# Patient Record
Sex: MALE | Race: WHITE | NOT HISPANIC OR LATINO | Employment: OTHER | URBAN - METROPOLITAN AREA
[De-identification: names, ages, dates, MRNs, and addresses within clinical notes are randomized per-mention and may not be internally consistent; named-entity substitution may affect disease eponyms.]

---

## 2024-01-29 ENCOUNTER — OFFICE VISIT (OUTPATIENT)
Dept: NEUROSURGERY | Facility: HOSPITAL | Age: 74
End: 2024-01-29
Payer: MEDICARE

## 2024-01-29 VITALS
BODY MASS INDEX: 30.16 KG/M2 | HEIGHT: 74 IN | DIASTOLIC BLOOD PRESSURE: 71 MMHG | RESPIRATION RATE: 18 BRPM | HEART RATE: 69 BPM | SYSTOLIC BLOOD PRESSURE: 106 MMHG | WEIGHT: 235 LBS

## 2024-01-29 DIAGNOSIS — M48.062 SPINAL STENOSIS OF LUMBAR REGION WITH NEUROGENIC CLAUDICATION: Primary | ICD-10-CM

## 2024-01-29 PROCEDURE — 99202 OFFICE O/P NEW SF 15 MIN: CPT | Performed by: NEUROLOGICAL SURGERY

## 2024-01-29 PROCEDURE — 1036F TOBACCO NON-USER: CPT | Performed by: NEUROLOGICAL SURGERY

## 2024-01-29 PROCEDURE — 99212 OFFICE O/P EST SF 10 MIN: CPT | Performed by: NEUROLOGICAL SURGERY

## 2024-01-29 PROCEDURE — 1159F MED LIST DOCD IN RCRD: CPT | Performed by: NEUROLOGICAL SURGERY

## 2024-01-29 PROCEDURE — 1125F AMNT PAIN NOTED PAIN PRSNT: CPT | Performed by: NEUROLOGICAL SURGERY

## 2024-01-29 RX ORDER — FUROSEMIDE 20 MG/1
20 TABLET ORAL DAILY PRN
COMMUNITY

## 2024-01-29 RX ORDER — ERGOCALCIFEROL 1.25 MG/1
50000 CAPSULE ORAL
COMMUNITY
Start: 2021-02-26

## 2024-01-29 RX ORDER — ROSUVASTATIN CALCIUM 5 MG/1
5 TABLET, COATED ORAL
COMMUNITY
Start: 2021-01-21

## 2024-01-29 RX ORDER — METOPROLOL TARTRATE 50 MG/1
25 TABLET ORAL 2 TIMES DAILY
COMMUNITY
Start: 2021-07-26

## 2024-01-29 RX ORDER — FLUOXETINE HYDROCHLORIDE 40 MG/1
40 CAPSULE ORAL
COMMUNITY
Start: 2021-06-14

## 2024-01-29 RX ORDER — CLOPIDOGREL BISULFATE 75 MG/1
75 TABLET ORAL DAILY
COMMUNITY

## 2024-01-29 RX ORDER — EVOLOCUMAB 140 MG/ML
140 INJECTION, SOLUTION SUBCUTANEOUS
COMMUNITY

## 2024-01-29 RX ORDER — BUPROPION HYDROCHLORIDE 75 MG/1
75 TABLET ORAL
COMMUNITY
Start: 2024-01-08

## 2024-01-29 RX ORDER — ASPIRIN 81 MG/1
81 TABLET ORAL 2 TIMES DAILY
COMMUNITY
Start: 2021-08-26

## 2024-01-29 RX ORDER — LANOLIN ALCOHOL/MO/W.PET/CERES
1000 CREAM (GRAM) TOPICAL
COMMUNITY
Start: 2021-03-25

## 2024-01-29 RX ORDER — FAMOTIDINE 20 MG/1
20 TABLET, FILM COATED ORAL 2 TIMES DAILY
COMMUNITY

## 2024-01-29 ASSESSMENT — ENCOUNTER SYMPTOMS
ALLERGIC/IMMUNOLOGIC NEGATIVE: 1
APNEA: 1
FATIGUE: 1
CONSTIPATION: 1
APPETITE CHANGE: 1
BRUISES/BLEEDS EASILY: 1
CONFUSION: 1
SHORTNESS OF BREATH: 1
DIFFICULTY URINATING: 1
ENDOCRINE NEGATIVE: 1
BACK PAIN: 1
ACTIVITY CHANGE: 1
EYES NEGATIVE: 1
WEAKNESS: 1
NECK PAIN: 1

## 2024-01-29 ASSESSMENT — PAIN SCALES - GENERAL: PAINLEVEL: 8

## 2024-01-29 NOTE — PROGRESS NOTES
"Today is here for ache, stabbing and burning pain in the back and down both legs R>L.  Has not had PT or pain management.  This has been 8 months.  At it's worse the pain is a 8 on scale of 0 to 10.    73-year-old man presents for evaluation of back pain and pain running down his legs.  He also complains about some pain in his neck.  The leg pain has been present for roughly 6 months of the back pain is much older.  He has not tried physical therapy or pain management.  He sometimes feels that his legs will give way.    Review of Systems   Constitutional:  Positive for activity change, appetite change and fatigue.   HENT: Negative.     Eyes: Negative.    Respiratory:  Positive for apnea and shortness of breath.    Cardiovascular:  Positive for leg swelling.   Gastrointestinal:  Positive for constipation.   Endocrine: Negative.    Genitourinary:  Positive for difficulty urinating.   Musculoskeletal:  Positive for back pain, gait problem and neck pain.   Skin: Negative.    Allergic/Immunologic: Negative.    Neurological:  Positive for weakness.   Hematological:  Bruises/bleeds easily.   Psychiatric/Behavioral:  Positive for confusion.        Visit Vitals  /71   Pulse 69   Resp 18   Ht 1.88 m (6' 2\")   Wt 107 kg (235 lb)   BMI 30.17 kg/m²   Smoking Status Never   BSA 2.36 m²           Current Outpatient Medications:     aspirin 81 mg EC tablet, Take 1 tablet (81 mg) by mouth 2 times a day., Disp: , Rfl:     buPROPion (Wellbutrin) 75 mg tablet, Take 1 tablet (75 mg) by mouth once daily., Disp: , Rfl:     clopidogrel (Plavix) 75 mg tablet, Take 1 tablet (75 mg) by mouth once daily., Disp: , Rfl:     cyanocobalamin (Vitamin B-12) 1,000 mcg tablet, Take 1 tablet (1,000 mcg) by mouth once daily., Disp: , Rfl:     ergocalciferol (Vitamin D-2) 1.25 MG (91439 UT) capsule, Take 1 capsule (50,000 Units) by mouth 1 (one) time per week., Disp: , Rfl:     famotidine (Pepcid) 20 mg tablet, Take 1 tablet (20 mg) by mouth 2 " times a day., Disp: , Rfl:     FLUoxetine (PROzac) 40 mg capsule, Take 1 capsule (40 mg) by mouth once daily., Disp: , Rfl:     furosemide (Lasix) 20 mg tablet, Take 1 tablet (20 mg) by mouth once daily as needed., Disp: , Rfl:     metoprolol tartrate (Lopressor) 50 mg tablet, Take 1 tablet by mouth once daily., Disp: , Rfl:     Repatha SureClick 140 mg/mL injection, Inject 1 mL (140 mg) under the skin every 14 (fourteen) days., Disp: , Rfl:     rosuvastatin (Crestor) 5 mg tablet, Take 1 tablet (5 mg) by mouth once daily., Disp: , Rfl:       Objective   Neurological Exam    On physical exam, the patient is alert and interactive.  He has good range of motion at the neck and in the low back.  Strength is 5 out of 5 throughout.  There is no evidence of hypertonia or hyperreflexia.      MRIs of the cervical and lumbar spine that I personally reviewed demonstrate multilevel degenerative disease.  There multiple small disc bulges in the neck but no kam cord compression.  In his lumbar spine, there is a grade 1 spondylolisthesis at L4-5 resulting in moderate to severe canal stenosis.    The patient has axial back pain and radiculopathy but has not yet attempted any nonoperative management.  Referrals made for physical therapy and pain medicine evaluation for symptom control.  If these do not help, he may be a candidate for lumbar decompression and fusion.

## 2024-02-06 ENCOUNTER — APPOINTMENT (OUTPATIENT)
Dept: NEUROSURGERY | Facility: CLINIC | Age: 74
End: 2024-02-06
Payer: MEDICARE

## 2024-02-07 ENCOUNTER — OFFICE VISIT (OUTPATIENT)
Dept: PAIN MEDICINE | Facility: CLINIC | Age: 74
End: 2024-02-07
Payer: MEDICARE

## 2024-02-07 VITALS
HEIGHT: 74 IN | WEIGHT: 235 LBS | SYSTOLIC BLOOD PRESSURE: 112 MMHG | RESPIRATION RATE: 22 BRPM | BODY MASS INDEX: 30.16 KG/M2 | DIASTOLIC BLOOD PRESSURE: 78 MMHG | HEART RATE: 68 BPM

## 2024-02-07 DIAGNOSIS — M48.062 LUMBAR STENOSIS WITH NEUROGENIC CLAUDICATION: Primary | ICD-10-CM

## 2024-02-07 DIAGNOSIS — M47.812 CERVICAL SPONDYLOSIS WITHOUT MYELOPATHY: ICD-10-CM

## 2024-02-07 PROCEDURE — 1125F AMNT PAIN NOTED PAIN PRSNT: CPT | Performed by: ANESTHESIOLOGY

## 2024-02-07 PROCEDURE — 1036F TOBACCO NON-USER: CPT | Performed by: ANESTHESIOLOGY

## 2024-02-07 PROCEDURE — 99204 OFFICE O/P NEW MOD 45 MIN: CPT | Performed by: ANESTHESIOLOGY

## 2024-02-07 PROCEDURE — 99214 OFFICE O/P EST MOD 30 MIN: CPT | Performed by: ANESTHESIOLOGY

## 2024-02-07 PROCEDURE — 1159F MED LIST DOCD IN RCRD: CPT | Performed by: ANESTHESIOLOGY

## 2024-02-07 ASSESSMENT — PAIN - FUNCTIONAL ASSESSMENT: PAIN_FUNCTIONAL_ASSESSMENT: 0-10

## 2024-02-07 ASSESSMENT — LIFESTYLE VARIABLES
AUDIT-C TOTAL SCORE: 1
SKIP TO QUESTIONS 9-10: 1
HOW OFTEN DO YOU HAVE A DRINK CONTAINING ALCOHOL: MONTHLY OR LESS
HOW OFTEN DO YOU HAVE SIX OR MORE DRINKS ON ONE OCCASION: NEVER
HOW MANY STANDARD DRINKS CONTAINING ALCOHOL DO YOU HAVE ON A TYPICAL DAY: 1 OR 2
TOTAL SCORE: 1

## 2024-02-07 ASSESSMENT — PATIENT HEALTH QUESTIONNAIRE - PHQ9
SUM OF ALL RESPONSES TO PHQ9 QUESTIONS 1 & 2: 0
2. FEELING DOWN, DEPRESSED OR HOPELESS: NOT AT ALL
1. LITTLE INTEREST OR PLEASURE IN DOING THINGS: NOT AT ALL

## 2024-02-07 ASSESSMENT — ENCOUNTER SYMPTOMS
NECK STIFFNESS: 1
ACTIVITY CHANGE: 1
NECK PAIN: 1
ARTHRALGIAS: 1
BACK PAIN: 1

## 2024-02-07 ASSESSMENT — PAIN SCALES - GENERAL
PAINLEVEL: 8
PAINLEVEL_OUTOF10: 8

## 2024-02-07 ASSESSMENT — PAIN DESCRIPTION - DESCRIPTORS: DESCRIPTORS: SHARP;SHOOTING

## 2024-02-07 NOTE — PROGRESS NOTES
The patient is a 73-year-old male with low back and bilateral leg pain.  The majority of the pain radiates down the posterior and lateral aspect of the right leg to the ankle.  He has had this pain for at least a year.  The pain is worsening.  The pain is at its worst when he is standing and walking.  He gets some relief with rest.  He also describes pain when  leaning forward on something such as a grocery cart or countertop.  He endorses weakness.  He endorses numbness and tingling.  He denies fevers chills nausea vomiting.  He denies incontinence of bowel or bladder.  He is unable to be active at all.  The patient fears participating in physical therapy as he believes this will make his pain worse.  The patient had a consultation with Dr. Krishna who did not recommend surgery at this time.  He also describes neck pain that radiates into the back of his head with range of motion of his neck.    Review of Systems   Constitutional:  Positive for activity change.   Musculoskeletal:  Positive for arthralgias, back pain, gait problem, neck pain and neck stiffness.   All other systems reviewed and are negative.    GENERAL: alert and appropriate, in no distress, well-hydrated, well nourished, interactive         SKIN: no rash noted         HEAD: normocephalic, no abnormality or lesion noted         EYES: no injection and visual acuity is grossly normal         EARS: external ears normal, no mastoid tenderness         NOSE: external nose normal without rhinorrhea         OROPHARYNX: moist mucus membranes, no tonsillar hypertrophy/exudate, uvula midline and pharynx non-erythematous, lips, teeth and gums are without obvious lesion         NECK: Reduced ROM, no cervical LNs noted         RESPIRATORY: breathing non-labored and no grunting/flaring/retractions         CHEST: equal chest rise with normal respiratory effort         ABDOMEN: soft and non-tender         BACK: back normal in appearance, cervical and lumbar spine with  reduced ROM         EXTREMITIES: strength intact         NEUROLOGIC: gait antalgic, SLR negative, Tate sign negative, Spurling sign reproduced pain, sensation grossly intact    Assessment and Plan    -Chronicity--chronic spinal pain    -Diagnostics--we reviewed the results of the lumbar spine MRI which shows moderate to severe stenosis at the L4-5 level    -Pharmacologic--no change    -Psychologic--no need for psychologic intervention from my standpoint    -Physical--we discussed the importance of physical therapy and exercise.  We discussed avoidance and modification techniques.  The patient requested a requisition for physical therapy for his cervical spinal pain.  I granted the patient's request.    -Intervention--the patient and I discussed the merits of a lumbar epidural steroid injection versus minimally invasive lumbar decompression.  We will pursue minimally invasive lumbar decompression at the L3-4 and the L4-5 levels.  I explained the risks benefits and alternatives of the procedure to the patient.  The patient wishes to proceed.    I spent time educating the patient on the condition including the treatment and the prognosis.  I invited the patient to call at anytime with any questions.

## 2024-02-29 ENCOUNTER — APPOINTMENT (OUTPATIENT)
Dept: RADIOLOGY | Facility: CLINIC | Age: 74
End: 2024-02-29
Payer: MEDICARE

## 2024-03-05 ENCOUNTER — TELEPHONE (OUTPATIENT)
Dept: PAIN MEDICINE | Facility: CLINIC | Age: 74
End: 2024-03-05
Payer: MEDICARE

## 2024-03-05 NOTE — TELEPHONE ENCOUNTER
I called Too today as a routine post op follow up call.  Only to find out his surgery wasa canceled last week due to no one telling him to hold his plavix.  Pt. was rather upset that he had to wait an extra week in pain as no one caught it. Too's plavix has been on hold since 2-29-24 for his scheduled surgery on 3-7-24.  I explained to the pt he needed to restart his plavix the day after his surgery.  While I had him on the phone we went over post op teaching and reminded him of his POV on 3-13-24 at 245 in [place] with Dr. Rodriguez.

## 2024-03-07 ENCOUNTER — HOSPITAL ENCOUNTER (OUTPATIENT)
Dept: RADIOLOGY | Facility: CLINIC | Age: 74
Discharge: HOME | End: 2024-03-07
Payer: MEDICARE

## 2024-03-07 ENCOUNTER — OUTSIDE PROCEDURE (OUTPATIENT)
Dept: PAIN MEDICINE | Facility: CLINIC | Age: 74
End: 2024-03-07

## 2024-03-07 DIAGNOSIS — Z00.6 ENCOUNTER FOR EXAMINATION FOR NORMAL COMPARISON AND CONTROL IN CLINICAL RESEARCH PROGRAM: ICD-10-CM

## 2024-03-07 DIAGNOSIS — G89.18 POST-OPERATIVE PAIN: Primary | ICD-10-CM

## 2024-03-07 DIAGNOSIS — M48.062 LUMBAR STENOSIS WITH NEUROGENIC CLAUDICATION: Primary | ICD-10-CM

## 2024-03-07 DIAGNOSIS — M48.062 SPINAL STENOSIS, LUMBAR REGION WITH NEUROGENIC CLAUDICATION: ICD-10-CM

## 2024-03-07 PROCEDURE — 0275T PR PERC LAMINO-/LAMINECTOMY INDIR IMAG GUIDE LUMBAR: CPT | Performed by: ANESTHESIOLOGY

## 2024-03-07 PROCEDURE — 0275T HC PERCUT LAMINOTOMY/LAMINECTOMY MILD PROCEDURE: CPT | Performed by: ANESTHESIOLOGY

## 2024-03-07 RX ORDER — ACETAMINOPHEN AND CODEINE PHOSPHATE 300; 30 MG/1; MG/1
1 TABLET ORAL EVERY 4 HOURS PRN
Qty: 20 TABLET | Refills: 0 | Status: SHIPPED | OUTPATIENT
Start: 2024-03-07 | End: 2024-03-12

## 2024-03-07 NOTE — PROGRESS NOTES
Pre and postprocedure diagnosis--stenosis with neurogenic claudication    Procedure--minimally invasive lumbar decompression at the L3-4 and the L4-5 levels    Anesthesia--local    Complications--none    Clinical note--the patient has a history of pain.  I explained the risks, benefits, and alternatives of the procedure to the patient.  The patient wishes to proceed.    Procedure Note--The patient was brought to the procedure room and placed in the prone position.  Ten 10 drapes were placed over the mid and lower lumbar spine.  The skin was prepped with isopropyl alcohol and ChloraPrep.  A fenestrated drape was applied.  Ioban was applied over the drape.  20 mL 0.5% bupivacaine were injected through a 22 gauge spinal needle to anesthetize the skin to the L5 lamina bilaterally and to the L4 lamina bilaterally.   A 3 mm incision was made over the L5 spinous process in the midline with an 11 blade.  The mild trocar was then guided to lamina of L5 on the right.  The trocar was removed from the working cannula.  A depth gauge was then placed over the cannula.  The mild rongeur was then guided to the superior aspect of the L5 lamina.  Multiple passes of the rongeur were then completed removing tissue including ligamentum flavum and osteophytes.  This was repeated at the inferior aspect of lamina of L4 on the right.  The tissue sculptor was then used to further debulk the L4-5 level on the right.  The procedure was then repeated at the L3-4 level to the right.  Then the procedure was repeated in its entirely on the left side at L4-5 and L3-4.  Once the procedure was completed, the equipment was removed.  The skin over the low lumbar spine was expressed until hemostasis was achieved.  Dermabond was placed over the wound.  A 2x2 was placed over the Dermabond then a Tegaderm was placed over the 2 x 2. The patient was then transferred to recovery.

## 2024-03-13 ENCOUNTER — APPOINTMENT (OUTPATIENT)
Dept: PAIN MEDICINE | Facility: CLINIC | Age: 74
End: 2024-03-13
Payer: MEDICARE

## 2024-03-15 NOTE — PROGRESS NOTES
Bhavesh Rodriguez MD  Pain Medicine Post-operative pain  Dx     Progress Notes  Bhavesh Rodriguez MD (Physician)  Pain Management  Pre and postprocedure diagnosis--stenosis with neurogenic claudication     Procedure--minimally invasive lumbar decompression at the L3-4 and the L4-5 levels     Anesthesia--local     Complications--none     Clinical note--the patient has a history of pain.  I explained the risks, benefits, and alternatives of the procedure to the patient.  The patient wishes to proceed.     Procedure Note--The patient was brought to the procedure room and placed in the prone position.  Ten 10 drapes were placed over the mid and lower lumbar spine.  The skin was prepped with isopropyl alcohol and ChloraPrep.  A fenestrated drape was applied.  Ioban was applied over the drape.  20 mL 0.5% bupivacaine were injected through a 22 gauge spinal needle to anesthetize the skin to the L5 lamina bilaterally and to the L4 lamina bilaterally.   A 3 mm incision was made over the L5 spinous process in the midline with an 11 blade.  The mild trocar was then guided to lamina of L5 on the right.  The trocar was removed from the working cannula.  A depth gauge was then placed over the cannula.  The mild rongeur was then guided to the superior aspect of the L5 lamina.  Multiple passes of the rongeur were then completed removing tissue including ligamentum flavum and osteophytes.  This was repeated at the inferior aspect of lamina of L4 on the right.  The tissue sculptor was then used to further debulk the L4-5 level on the right.  The procedure was then repeated at the L3-4 level to the right.  Then the procedure was repeated in its entirely on the left side at L4-5 and L3-4.  Once the procedure was completed, the equipment was removed.  The skin over the low lumbar spine was expressed until hemostasis was achieved.  Dermabond was placed over the wound.  A 2x2 was placed over the Dermabond then a Tegaderm was placed  over the 2 x 2. The patient was then transferred to recovery.

## 2024-03-20 ENCOUNTER — OFFICE VISIT (OUTPATIENT)
Dept: PAIN MEDICINE | Facility: CLINIC | Age: 74
End: 2024-03-20
Payer: MEDICARE

## 2024-03-20 VITALS
HEIGHT: 74 IN | DIASTOLIC BLOOD PRESSURE: 72 MMHG | SYSTOLIC BLOOD PRESSURE: 116 MMHG | BODY MASS INDEX: 30.16 KG/M2 | RESPIRATION RATE: 22 BRPM | WEIGHT: 235 LBS | OXYGEN SATURATION: 98 % | HEART RATE: 71 BPM

## 2024-03-20 DIAGNOSIS — M48.062 LUMBAR STENOSIS WITH NEUROGENIC CLAUDICATION: Primary | ICD-10-CM

## 2024-03-20 PROCEDURE — 1036F TOBACCO NON-USER: CPT | Performed by: ANESTHESIOLOGY

## 2024-03-20 PROCEDURE — 99214 OFFICE O/P EST MOD 30 MIN: CPT | Performed by: ANESTHESIOLOGY

## 2024-03-20 PROCEDURE — 1159F MED LIST DOCD IN RCRD: CPT | Performed by: ANESTHESIOLOGY

## 2024-03-20 PROCEDURE — 1125F AMNT PAIN NOTED PAIN PRSNT: CPT | Performed by: ANESTHESIOLOGY

## 2024-03-20 ASSESSMENT — ENCOUNTER SYMPTOMS
ACTIVITY CHANGE: 1
BACK PAIN: 1

## 2024-03-20 ASSESSMENT — PAIN DESCRIPTION - DESCRIPTORS: DESCRIPTORS: TIGHTNESS

## 2024-03-20 ASSESSMENT — PAIN - FUNCTIONAL ASSESSMENT: PAIN_FUNCTIONAL_ASSESSMENT: 0-10

## 2024-03-20 ASSESSMENT — PAIN SCALES - GENERAL
PAINLEVEL: 7
PAINLEVEL_OUTOF10: 7

## 2024-03-20 NOTE — PROGRESS NOTES
The patient is a 73-year-old male with low back and bilateral leg pain.  The patient underwent minimally invasive lumbar decompression recently.  The patient notices no benefit thus far.  The patient is here to discuss his options for treatment.    Review of Systems   Constitutional:  Positive for activity change.   Musculoskeletal:  Positive for back pain and gait problem.   All other systems reviewed and are negative.    GENERAL: alert and appropriate, in no distress, well-hydrated, well-nourished and interactive  SKIN: no rash noted, surgical scars well healed  RESPIRATORY: breathing non-labored and no grunting/flaring/retractions  CHEST: equal chest rise with normal respiratory effort  ABDOMEN: soft and non-tender  BACK: back normal in appearance, spine with reduced ROM  EXTREMITIES: strength intact  NEUROLOGIC: gait antalgic, SLR negative, sensation grossly intact.    Assessment and Plan    -Chronicity--chronic spinal pain    -Diagnostics--no new imaging    -Pharmacologic--no change    -Psychologic--no need for psychologic intervention from my standpoint    -Physical--we discussed the importance of physical therapy and exercise.  We discussed avoidance and modification techniques.  I would like the patient to participate in a formal physical therapy program.    -Intervention--I reassured the patient that the most people would notice benefit by this time but I am still optimistic that he can benefit from the procedure.    I spent time educating the patient on the condition including the treatment and the prognosis.  I invited the patient to call at anytime with any questions.

## 2024-04-03 ENCOUNTER — PREP FOR PROCEDURE (OUTPATIENT)
Dept: NEUROSURGERY | Facility: HOSPITAL | Age: 74
End: 2024-04-03
Payer: MEDICARE

## 2024-04-03 DIAGNOSIS — M43.17 ACQUIRED SPONDYLOLISTHESIS OF LUMBOSACRAL REGION: Primary | ICD-10-CM

## 2024-04-08 ENCOUNTER — APPOINTMENT (OUTPATIENT)
Dept: PAIN MEDICINE | Facility: CLINIC | Age: 74
End: 2024-04-08
Payer: MEDICARE

## 2024-04-08 ENCOUNTER — OFFICE VISIT (OUTPATIENT)
Dept: NEUROSURGERY | Facility: CLINIC | Age: 74
End: 2024-04-08
Payer: MEDICARE

## 2024-04-08 VITALS
HEART RATE: 72 BPM | DIASTOLIC BLOOD PRESSURE: 60 MMHG | BODY MASS INDEX: 30.16 KG/M2 | WEIGHT: 235 LBS | HEIGHT: 74 IN | SYSTOLIC BLOOD PRESSURE: 122 MMHG | RESPIRATION RATE: 20 BRPM

## 2024-04-08 DIAGNOSIS — M48.062 SPINAL STENOSIS OF LUMBAR REGION WITH NEUROGENIC CLAUDICATION: Primary | ICD-10-CM

## 2024-04-08 PROCEDURE — 1159F MED LIST DOCD IN RCRD: CPT | Performed by: NEUROLOGICAL SURGERY

## 2024-04-08 PROCEDURE — 1036F TOBACCO NON-USER: CPT | Performed by: NEUROLOGICAL SURGERY

## 2024-04-08 PROCEDURE — 99212 OFFICE O/P EST SF 10 MIN: CPT | Performed by: NEUROLOGICAL SURGERY

## 2024-04-08 PROCEDURE — 1125F AMNT PAIN NOTED PAIN PRSNT: CPT | Performed by: NEUROLOGICAL SURGERY

## 2024-04-08 ASSESSMENT — PAIN SCALES - GENERAL: PAINLEVEL: 8

## 2024-04-08 NOTE — PROGRESS NOTES
Having ache, stabbing and burning pain in the back and down both legs R>L. Today it is mostly the right leg. Had pain management, MILD procedure with Dr. Reyna.    73-year-old man returns for follow-up for lumbar spondylosis.  The patient has been through pain management and underwent a minimally invasive lumbar decompression without significant relief.  He continues to have severe back pain and pain running down his right leg that he feels is debilitating.    On exam, he is alert and interactive.  Spine is nontender.  He moves all extremities with full strength.    The MRI that I personally reviewed demonstrates the grade 1 spondylolisthesis at L4-5 with severe canal stenosis and nerve root compression.  Of note, the patient also has cervical stenosis with early impingement on cervical cord but is asymptomatic from this.    Given the patient's symptoms and failure to improve with nonoperative therapy, he is a candidate for right L4-5 TLIF with decompressive laminectomy.  We reviewed the risks and benefits of this today.  The patient wishes to proceed with surgery.

## 2024-04-10 DIAGNOSIS — M48.062 SPINAL STENOSIS OF LUMBAR REGION WITH NEUROGENIC CLAUDICATION: Primary | ICD-10-CM

## 2024-04-10 PROBLEM — M43.17 ACQUIRED SPONDYLOLISTHESIS OF LUMBOSACRAL REGION: Status: ACTIVE | Noted: 2024-04-03

## 2024-04-11 ENCOUNTER — TELEPHONE (OUTPATIENT)
Dept: PAIN MEDICINE | Facility: CLINIC | Age: 74
End: 2024-04-11
Payer: MEDICARE

## 2024-04-17 ENCOUNTER — EVALUATION (OUTPATIENT)
Dept: PHYSICAL THERAPY | Facility: CLINIC | Age: 74
End: 2024-04-17
Payer: MEDICARE

## 2024-04-17 DIAGNOSIS — M48.062 LUMBAR STENOSIS WITH NEUROGENIC CLAUDICATION: Primary | ICD-10-CM

## 2024-04-17 PROCEDURE — 97162 PT EVAL MOD COMPLEX 30 MIN: CPT | Mod: GP

## 2024-04-17 NOTE — PROGRESS NOTES
Physical Therapy Evaluation    Patient Name: Too Thornton  MRN: [id number]  Evaluation Date: 4/17/2024  Time Calculation  Start Time: 1205  Stop Time: 1250  Time Calculation (min): 45 min  PT Evaluation Time Entry  PT Evaluation (Moderate) Time Entry: 25          Problem List Items Addressed This Visit    None  Visit Diagnoses         Codes    Lumbar stenosis with neurogenic claudication    -  Primary M48.062    Relevant Orders    Follow Up In Physical Therapy              Subjective   General:  Patient is a 74 yo male with diagnosis of lumbar stenosis with neurogenic claudication.  Patient reports chief complaint of LBP with bilateral LE radicular pain.  Patient reports fairly extensive history of LBP; however, was working on car 10/23 when current symptom intensity increase.  Patient for MILD (minimally invasive lumbar decompression) 3/7/2024 (pain management) nor relief.  Patient referred to PT for aquatics.  Patient to have fusion lumbar spine 5/30.  Patient motivated to participate in PT.         Surgery:   MILD procedure 3/7    Precautions:  CANCER HISTORY- NO ULTRASOUND    Relevant PMH:  CANCER-prostate  CABG X 5  X 3 stents placed  HTN  OA  SPONDYLOSITHESIS L4-5- LIMIT EXTENSION  Neck pain      Pain:  LBP/bilateral LE pain- thigh  to calves  At worst 8/10, pain with erect standing, prolonged standing  >10+ minutes     Home Living:  Home type: House  Stairs: Yes  Lives with: Spouse  Occupation: retired  Hobbies/activities: photography    Prior Function Per Pt/Caregiver Report:  Steadily decreasing activity since 10/23    Imaging:  Per pain management notes- umbar spine, there is a grade 1 spondylolisthesis at L4-5 resulting in moderate to severe canal stenosis.     Objective   Posture:  FHP     Range of Motion:  Lumbar ROM Range   Flexion WFL- relief   Extension Unable to stand erect- pain   R rotation 25% restricted- tightness   L rotation 25% restricted- tightness   R sidebend 25% restricted- tightness    L sidebend 25% restricted - tightness         Strength:  LE MMT L R   Hip flexion 4-/5 4-/5   Hip extension 4-/5 4-/5   Hip abduction 4/5 4/5   Hip adduction 4/5 4/5   Knee flexion 4/5 4/5   Knee extension 4/5 4/5   Ankle DF 4/5 4/5   Ankle PF 4/5 4/5        Flexibility:  ++ HS tightness  ++ OVIDIO tightness     Palpation:  No gross tenderness    Special Tests:  SLR- negative     Gait:  Flexed trunk  Modest right sided lurch  Uses walker in shower       Bed Mobility:  Independent      Transfers:  Good log roll  Uses UEs with sit to stand      Outcome Measures:  Oswesty  30 points  60% impaired    Assessment     Pt is a 73 y.o. male who presents with impairments of pain/limited trunk ROM/LE and core weakness. These impairments have led to functional limitations including difficulty with standing, walking and associated ADLs. Pt would benefit from skilled physical therapy intervention to improve above impairments and facilitate return to function.    Plan  2x/week  10 total visits   aquatics    Plan for next visit:   Initiate aquatics    OP EDUCATION:  Orientation to aquatics    Today's Treatment:  Evaluation completed  Orientation to aquatics    Goals:  STG(3 visits)  Patient to tolerate 35 minutes of aquatic PT    LTG(10 visits)  Patient to stand/walk for 15 minutes without need to sit  Oswestry to <40% impaired

## 2024-04-22 ENCOUNTER — APPOINTMENT (OUTPATIENT)
Dept: PAIN MEDICINE | Facility: CLINIC | Age: 74
End: 2024-04-22
Payer: MEDICARE

## 2024-04-24 ENCOUNTER — TELEPHONE (OUTPATIENT)
Dept: NEUROSURGERY | Facility: HOSPITAL | Age: 74
End: 2024-04-24
Payer: MEDICARE

## 2024-04-24 NOTE — TELEPHONE ENCOUNTER
Talked to pt after Dr. Krishna had talked to his insurance company , he needs 6 weeks of P.T. for them to approve the surgery. He is going to do 2 sessions every week for the next 4 weeks. He will let us know how he is doing. I did fax ([phone])to [facility] the eval from the P.T.

## 2024-04-29 NOTE — PROGRESS NOTES
"    Physical Therapy Treatment    Patient Name: Too Thornton  MRN: [id number]  Encounter date:  4/30/2024  Time Calculation  Start Time: 1205   Stop Time: 1248  Time Calculation (min): 43 min  PT Therapeutic Procedures Time Entry  Aquatic Therapy Time Entry: 43    Visit Number:  2 (including evaluation)  Planned total visits: 10  Visits Authorized/Insurance Coverage:  $15.00 CO PAY, 100% [place], [place], NO AUTH AETNA     Current Problem  Problem List Items Addressed This Visit    None  Visit Diagnoses         Codes    Lumbar stenosis with neurogenic claudication     M48.062          Surgery:   MILD procedure 3/7    Precautions  CANCER HISTORY- NO ULTRASOUND     Relevant PMH:  CANCER-prostate  CABG X 5  X 3 stents placed  HTN  OA  SPONDYLOSITHESIS L4-5- LIMIT EXTENSION  Neck pain    Pain  7/10    Subjective  General       Patient reports low back and bilateral lower extremity pain at level noted above prior to beginning therex. Patient states his low back pain hurts too much to participate in many activities, so he spends most of his time \"sitting around\".     Objective    Patient entered pool with step to gait ambulation on stairs, single upper extremity support. Fair+ balance with across pool ambulation/laps. Patient exited pool with step to gait, BUE on rails.     Treatment:  Aquatic Therapy:   Warm up: 2 laps across pool forward walking    Standing at rail, each leg:   -Heel Raises x 15  -Toe raises x 15  -Hip abduction/side kicks x 15  -Hip Ext/kick backs x 15  -SLR/Hip flexion/ forward kicks x 15  -Hamstring curls x 15   -Seated LAQ x 15  - Minisquats x 15    Patient then moved to the 7' depth.  Completed:  - Hang with pool noodle to unweight LE and trunk x 2 minutes  - Hip abduction/side kicks 2 minutes  - Hang with pool noodle to unweight LE and trunk 2 minutes  - Bicycles x 1 minutes.  - Hang with pool noodle to unweight LE and trunkx 2 minutes.  - Scissor Kicking/forward-backward x 1 minutes  - Hang with pool " noodle to unweight LE and trunk x 2 minutes     1 lap across pool to re acclimate to gravity: Fwd, bkwd, side steps   Standing hamstring stretch at pool steps, 2 x 20 seconds each leg    Current HEP:    Activity tolerance:  Good     OP EDUCATION:    Assessment:       Verbal and visual instruction for standing and floating therex, good carry over and response. Decreased low back pain with both standing and floating therex while in the pool, mild increase in low back pain when standing on pool deck after session within increased weight on BLE and low back.     Pt's response to treatment:  Good  Areas of improvements:  Decreased low back pain with activity in the pool   Limitations/deficits:  Remaining low back pain when weight bearing out of the water    Pain end of session: 3/10    Plan:    Continue with current POC/no changes    Assessment of current progress against goals:  Progressing toward functional goals and Symptomatic relief with treatment    Goals:  STG(3 visits)  Patient to tolerate 35 minutes of aquatic PT     LTG(10 visits)  Patient to stand/walk for 15 minutes without need to sit  Oswestry to <40% impaired

## 2024-04-30 ENCOUNTER — TREATMENT (OUTPATIENT)
Dept: PHYSICAL THERAPY | Facility: CLINIC | Age: 74
End: 2024-04-30
Payer: MEDICARE

## 2024-04-30 DIAGNOSIS — M48.062 LUMBAR STENOSIS WITH NEUROGENIC CLAUDICATION: ICD-10-CM

## 2024-04-30 PROCEDURE — 97113 AQUATIC THERAPY/EXERCISES: CPT | Mod: CQ,GP | Performed by: SPECIALIST/TECHNOLOGIST

## 2024-05-01 NOTE — PROGRESS NOTES
Physical Therapy Treatment    Patient Name: Too Thornton  MRN: [id number]  Encounter date:  5/2/2024  Time Calculation  Start Time: 1205  Stop Time: 1245  Time Calculation (min): 40 min  PT Therapeutic Procedures Time Entry  Aquatic Therapy Time Entry: 40     Visit Number:  3 (including evaluation)  Planned total visits: 10  Visits Authorized/Insurance Coverage:  $15.00 [place], [place], [place] [employer]     Current Problem  Problem List Items Addressed This Visit    None  Visit Diagnoses         Codes    Lumbar stenosis with neurogenic claudication     M48.062          Surgery:   MILD procedure 3/7    Precautions:  CANCER HISTORY- NO ULTRASOUND     Relevant PMH:  CANCER-prostate  CABG X 5  X 3 stents placed  HTN  OA  SPONDYLOSITHESIS L4-5- LIMIT EXTENSION  Neck pain    Pain  8/10    Subjective  General  Patient reports increased soreness from increased activity after last visit, noting he felt good the remainder of the day after last session, but was more sore upon waking up today.    Objective    Fair balance with across pool ambulation, Retro<fwd or side steps. Uses BUE support on rails for standing and floating therex w/ noodle.    Treatment:  Aquatic Therapy:     Enters pool with step to gait, BUE support on rails  Warm up: 2 laps across pool forward walking     Standing at rail, each leg:   -Heel Raises x 15  -Toe raises x 15  -Hip abduction/side kicks x 15  -Hip Ext/kick backs x 15  -SLR/Hip flexion/ forward kicks x 15  -Hamstring curls x 15   -Seated LAQ x 15  - Minisquats x 15     Patient then moved to the 7' depth.  Completed:  - Hang with pool noodle to unweight LE and trunk x 2 minutes  - Hip abduction/side kicks 2 minutes  - Hang with pool noodle to unweight LE and trunk 2 minutes  - Bicycles x 2 minutes.  - Hang with pool noodle to unweight LE and trunkx 2 minutes.  - Scissor Kicking/forward-backward x 2 minutes  - Hang with pool noodle to unweight LE and trunk x 2 minutes     1 lap across  "pool to re acclimate to gravity: Fwd, bkwd, side steps     Standing hamstring stretch at pool steps, 2 x 20\" each leg     Current HEP:    Activity tolerance:  Fair    OP EDUCATION:      Assessment:       Moderate verbal and visual cueing provided for improved posture and exercise technique on standing therex, fewer cues needed for floating exercises. Increased BLE soreness at rest vs last appointment. Pt condition/soreness monitored through therex, additional rests breaks given as needed. No change in rating of soreness at end of appointment.     Pt's response to treatment: Fair  Areas of improvements:    Limitations/deficits: Increased BLE soreness from activity    Pain end of session: 8/10    Plan:    Continue with current POC/no changes    Assessment of current progress against goals:  Progressing toward functional goals    Goals:  STG(3 visits)  Patient to tolerate 35 minutes of aquatic PT     LTG(10 visits)  Patient to stand/walk for 15 minutes without need to sit  Oswestry to <40% impaired  "

## 2024-05-02 ENCOUNTER — TREATMENT (OUTPATIENT)
Dept: PHYSICAL THERAPY | Facility: CLINIC | Age: 74
End: 2024-05-02
Payer: MEDICARE

## 2024-05-02 DIAGNOSIS — M48.062 LUMBAR STENOSIS WITH NEUROGENIC CLAUDICATION: ICD-10-CM

## 2024-05-02 PROCEDURE — 97113 AQUATIC THERAPY/EXERCISES: CPT | Mod: CQ,GP | Performed by: SPECIALIST/TECHNOLOGIST

## 2024-05-06 NOTE — PROGRESS NOTES
Physical Therapy Treatment    Patient Name: Too Thornton  MRN: [id number]  Time Calculation  Start Time: 1205  Stop Time: 1249  Time Calculation (min): 44 min  PT Therapeutic Procedures Time Entry  Aquatic Therapy Time Entry: 44     Visit Number:  4 (including evaluation)  Planned total visits: 10  Visits Authorized/Insurance Coverage:  $15.00 CO PAY, [place]% [place], NO AUTH [employer]     Current Problem  Problem List Items Addressed This Visit    None  Visit Diagnoses         Codes    Lumbar stenosis with neurogenic claudication     M48.062            recautions:  CANCER HISTORY- NO ULTRASOUND     Relevant PMH:  CANCER-prostate  CABG X 5  X 3 stents placed  HTN  OA  SPONDYLOSITHESIS L4-5- LIMIT EXTENSION  Neck pain    Pain  7/10    Subjective  General       Patient reports a consisted 7/10 soreness, noting it was better earlier today, but may have increased with the drive to the clinic, approximately 20 minutes.     Objective       Rounded shoulder posture with standing therex, verbal cues provided to improve, fair carryover. Improved posture with standing and walking on pool deck at end of treatment.    Treatment:  Aquatic Therapy:      Enters pool with step to gait, BUE support on rails  Warm up: 3 laps across pool forward, bkwd, side to side      Standing at rail, each leg:   -Heel Raises x 20  -Toe raises x 20  -Hip abduction/side kicks x 20  -Hip Ext/kick backs x 15-DNP  -SLR/Hip flexion/ forward kicks x 20  -Hamstring curls x 15   -Seated LAQ x 15  - Minisquats x 15     Patient then moved to the 7' depth.  Completed:  - Hang with pool noodle to unweight LE and trunk x 2 minutes  - Hip abduction/side kicks 2 minutes  - Hang with pool noodle to unweight LE and trunk 2 minutes  - Bicycles x 2 minutes.  - Hang with pool noodle to unweight LE and trunkx 2 minutes.  - Scissor Kicking/forward-backward x 2 minutes  - Hang with pool noodle to unweight LE and trunk x 2 minutes     1 lap across pool to re acclimate  "to gravity: Fwd, bkwd, side steps      Standing hamstring stretch at pool steps, 2 x 20\" each leg     Current HEP:    Activity tolerance:  Fair    OP EDUCATION:    Assessment:       Verbal cueing provided for improved posture with standing therex. Increased RLE pain with hip extensions, exercise discontinued. No significant increase in pain with other standing therex, decreased low back and BLE pain following floating therex, denied increase when standing on pool deck after treatment.      Pt's response to treatment:  Fair  Areas of improvements:  Improved self posture correction with activity  Limitations/deficits: Increased low back/ RLE pain with active hip extension    Pain end of session: 4/10    Plan:    Continue with current POC/no changes    Assessment of current progress against goals:  Progressing toward functional goals    Goals:  STG(3 visits)  Patient to tolerate 35 minutes of aquatic PT     LTG(10 visits)  Patient to stand/walk for 15 minutes without need to sit  Oswestry to <40% impaired  "

## 2024-05-07 ENCOUNTER — TREATMENT (OUTPATIENT)
Dept: PHYSICAL THERAPY | Facility: CLINIC | Age: 74
End: 2024-05-07
Payer: MEDICARE

## 2024-05-07 DIAGNOSIS — M48.062 LUMBAR STENOSIS WITH NEUROGENIC CLAUDICATION: ICD-10-CM

## 2024-05-07 PROCEDURE — 97113 AQUATIC THERAPY/EXERCISES: CPT | Mod: CQ,GP | Performed by: SPECIALIST/TECHNOLOGIST

## 2024-05-08 NOTE — PROGRESS NOTES
Physical Therapy Treatment    Patient Name: Too Thornton  MRN: [id number]  Encounter date:  5/9/2024  Time Calculation  Start Time: 1447   Stop Time: 1530  Time Calculation (min): 43 min  PT Therapeutic Procedures Time Entry  Aquatic Therapy Time Entry: 43     Visit Number:  5 (including evaluation)  Planned total visits: 10  Visits Authorized/Insurance Coverage:  $15.00 [place], [place]% [place], [place] [employer]        Current Problem  Problem List Items Addressed This Visit    None  Visit Diagnoses         Codes    Lumbar stenosis with neurogenic claudication     M48.062          Precautions:  CANCER HISTORY- NO ULTRASOUND     Relevant PMH:  CANCER-prostate  CABG X 5  X 3 stents placed  HTN  OA  SPONDYLOSITHESIS L4-5- LIMIT EXTENSION  Neck pain    Pain  8/10    Subjective  General       Patient reports a consistent level of soreness over the past few appointments, noting he believes it is because he is not use to the exercises he is performing, but also stating he is willing to continue with the current level of activity. Patient reports his insurance has approved the surgery.     Objective        Anterior L ankle/foot bruising observed, caused by compromised LLE circulation. Fair - balance on standing therex with black water weights for balance. Improved core control during floating activities in deep end.     Treatment:  Aquatic Therapy:   Enters pool with step to gait, BUE support on rails  Warm up: 3 laps across pool forward, bkwd, side to side      Standing at rail, each leg:    -Heel Raises x 20  -Toe raises x 20  -Hip abduction/side kicks 2 x 10  -Hip Ext/kick backs 2 x 10  -SLR/Hip flexion/ forward kicks 2 x 10  -Hamstring curls 2 x 10   -Seated LAQ 2 x 10  -Minisquats 2 x 10     Deep end: 7' depth.  Completed:  - Hang with pool noodle to unweight LE and trunk x 2 minutes  - Hip abduction/side kicks 2 minutes  - Hang with pool noodle to unweight LE and trunk 2 minutes  - Bicycles x 2 minutes.  - Hang  "with pool noodle to unweight LE and trunkx 2 minutes.  - Scissor Kicking/forward-backward x 2 minutes  - Hang with pool noodle to unweight LE and trunk x 2 minutes     1 lap across pool to re acclimate to gravity: Fwd, bkwd, side steps      DNP  Standing hamstring stretch at pool steps, 2 x 20\" each leg    Current HEP:    Activity tolerance:  Good    OP EDUCATION:    Assessment:      Trialed black water weights for balance with standing therex, returned to rail to improve balance. Verbal cueing provided for improved standing posture/ TrA bracing. Hip extension performed in pain free range of motion BLE. Mild decrease in low back pain after floating exercises.     Pt's response to treatment:  Good  Areas of improvements: Mild symptom relief with activity in deep end   Limitations/deficits: Remaining RLE pain with hip extension and decreased balance with standing therex.     Pain end of session: 6/10     Plan:  Continue with current POC/no changes    Assessment of current progress against goals:  Progressing toward functional goals and Symptomatic relief with treatment    Goals:   STG(3 visits)  Patient to tolerate 35 minutes of aquatic PT     LTG(10 visits)  Patient to stand/walk for 15 minutes without need to sit  Oswestry to <40% impaired  "

## 2024-05-09 ENCOUNTER — CLINICAL SUPPORT (OUTPATIENT)
Dept: PREADMISSION TESTING | Facility: HOSPITAL | Age: 74
End: 2024-05-09
Payer: MEDICARE

## 2024-05-09 ENCOUNTER — TREATMENT (OUTPATIENT)
Dept: PHYSICAL THERAPY | Facility: CLINIC | Age: 74
End: 2024-05-09
Payer: MEDICARE

## 2024-05-09 DIAGNOSIS — M48.062 LUMBAR STENOSIS WITH NEUROGENIC CLAUDICATION: ICD-10-CM

## 2024-05-09 PROCEDURE — 97113 AQUATIC THERAPY/EXERCISES: CPT | Mod: CQ,GP | Performed by: SPECIALIST/TECHNOLOGIST

## 2024-05-09 ASSESSMENT — DUKE ACTIVITY SCORE INDEX (DASI)
CAN YOU TAKE CARE OF YOURSELF (EAT, DRESS, BATHE, OR USE TOILET): YES
CAN YOU DO MODERATE WORK AROUND THE HOUSE LIKE VACUUMING, SWEEPING FLOORS OR CARRYING GROCERIES: YES
CAN YOU HAVE SEXUAL RELATIONS: NO
CAN YOU DO HEAVY WORK AROUND THE HOUSE LIKE SCRUBBING FLOORS OR LIFTING AND MOVING HEAVY FURNITURE: NO
DASI METS SCORE: 6.2
CAN YOU CLIMB A FLIGHT OF STAIRS OR WALK UP A HILL: YES
CAN YOU DO YARD WORK LIKE RAKING LEAVES, WEEDING OR PUSHING A MOWER: YES
CAN YOU WALK INDOORS, SUCH AS AROUND YOUR HOUSE: YES
CAN YOU DO LIGHT WORK AROUND THE HOUSE LIKE DUSTING OR WASHING DISHES: YES
CAN YOU PARTICIPATE IN STRENOUS SPORTS LIKE SWIMMING, SINGLES TENNIS, FOOTBALL, BASKETBALL, OR SKIING: YES
CAN YOU RUN A SHORT DISTANCE: NO
CAN YOU WALK A BLOCK OR TWO ON LEVEL GROUND: NO
TOTAL_SCORE: 28.2
CAN YOU PARTICIPATE IN MODERATE RECREATIONAL ACTIVITIES LIKE GOLF, BOWLING, DANCING, DOUBLES TENNIS OR THROWING A BASEBALL OR FOOTBALL: NO

## 2024-05-09 ASSESSMENT — ENCOUNTER SYMPTOMS
NECK STIFFNESS: 1
WEAKNESS: 1
JOINT SWELLING: 1
NECK PAIN: 1
LIMITED RANGE OF MOTION: 1
ARTHRALGIAS: 1
BRUISES/BLEEDS EASILY: 1
CONSTITUTIONAL NEGATIVE: 1
RESPIRATORY NEGATIVE: 1
CONSTIPATION: 1
DIFFICULTY URINATING: 1
MYALGIAS: 1

## 2024-05-09 NOTE — CPM/PAT NURSE NOTE
CPM/PAT Nurse Note      Name: Too Thornton (Too Thornton)  /Age: 1950/73 y.o.       Fusion Spine Transforaminal Interbody Lumbar Dr Erendira MD 2024  PCP Dr Fontana [facility] [address]  PM Dr Rodriguez [facility] 23  Urology Dr Underwood [facility] CaP no treatment  Rheumatology OV Dr Yee SÁNCHEZ  Cardiology Dr Ingram [facility] Plavix     Past Medical History:   Diagnosis Date    Acquired spondylolisthesis of lumbosacral region     Anticoagulated     plavix    Arthritis     Back pain     BMI 30.0-30.9,adult     Chronic pain disorder     Colon polyp     Coronary artery disease     Depression     DJD (degenerative joint disease)     Epistaxis     History of blood transfusion     Hyperlipidemia     Hypertension     Joint pain     Lumbar disc disease     MTHFR (methylene THF reductase) deficiency and homocystinuria (Multi)     Prostate cancer (Multi)     no treatment    Sleep apnea     Spinal stenosis     Vascular insufficiency        Past Surgical History:   Procedure Laterality Date    CARDIAC CATHETERIZATION      CAROTID STENT      CHOLECYSTECTOMY      COLONOSCOPY      CORONARY ANGIOPLASTY      CORONARY STENT PLACEMENT      OTHER SURGICAL HISTORY  2022    Cholecystectomy    PROSTATE BIOPSY         Patient  reports that he is not currently sexually active.    Family History   Problem Relation Name Age of Onset    Other (htn) Mother      Cancer Mother      Other (htn) Father      Heart disease Father      Diabetes Sister      Heart disease Sister      Other (liver transplant) Sister      Heart disease Brother         Allergies   Allergen Reactions    Statins-Hmg-Coa Reductase Inhibitors Other       Prior to Admission medications    Medication Sig Start Date End Date Taking? Authorizing Provider   aspirin 81 mg EC tablet Take 1 tablet (81 mg) by mouth 2 times a day. 21  Yes Historical Provider, MD   buPROPion (Wellbutrin) 75 mg tablet Take 1 tablet (75 mg) by mouth once daily. 24  Yes  Historical Provider, MD   clopidogrel (Plavix) 75 mg tablet Take 1 tablet (75 mg) by mouth once daily.   Yes Historical Provider, MD   cyanocobalamin (Vitamin B-12) 1,000 mcg tablet Take 1 tablet (1,000 mcg) by mouth once daily. 3/25/21  Yes Historical Provider, MD   ergocalciferol (Vitamin D-2) 1.25 MG (61761 UT) capsule Take 1 capsule (50,000 Units) by mouth 1 (one) time per week. 2/26/21  Yes Historical Provider, MD   famotidine (Pepcid) 20 mg tablet Take 1 tablet (20 mg) by mouth 2 times a day.   Yes Historical Provider, MD   FLUoxetine (PROzac) 40 mg capsule Take 1 capsule (40 mg) by mouth once daily. 6/14/21  Yes Historical Provider, MD   metoprolol tartrate (Lopressor) 50 mg tablet Take 1 tablet by mouth once daily. 7/26/21  Yes Historical Provider, MD   Repathphoenix SureClick 140 mg/mL injection Inject 1 mL (140 mg) under the skin every 14 (fourteen) days.   Yes Historical Provider, MD   rosuvastatin (Crestor) 5 mg tablet Take 1 tablet (5 mg) by mouth once daily. 1/21/21  Yes Historical Provider, MD   furosemide (Lasix) 20 mg tablet Take 1 tablet (20 mg) by mouth once daily as needed.    Historical Provider, MD GARZA ROS:   Constitutional:   neg    Neuro/Psych:    weakness (legs)  Eyes:    use of corrective lenses  Ears:    hearing loss   tinnitus  Nose:   neg    Mouth:   neg    Throat:   neg    Neck:    neck pain   neck stiffness  Cardio:    peripheral edema  Respiratory:   neg    Endocrine:    cold intolerance  GI:    constipation  :    difficulty urinating  Musculoskeletal:    arthralgias   myalgias   decreased ROM   swelling  Hematologic:    Plavix   bruises/bleeds easily   history of blood transfusion (2016)  Skin:   SCC face      DASI Risk Score      Flowsheet Row Most Recent Value   DASI SCORE 28.2   METS Score (Will be calculated only when all the questions are answered) 6.2          Caprini DVT Assessment    No data to display       Modified Frailty Index    No data to display       CHADS2 Stroke  Risk  Current as of 3 hours ago        N/A 3 to 100%: High Risk   2 to < 3%: Medium Risk   0 to < 2%: Low Risk     Last Change: N/A          This score determines the patient's risk of having a stroke if the patient has atrial fibrillation.        This score is not applicable to this patient. Components are not calculated.          Revised Cardiac Risk Index    No data to display       Apfel Simplified Score    No data to display       Risk Analysis Index Results This Encounter    No data found in the last 1 encounters.         Nurse Plan of Action:   No vitamins or supplements x 7 days prior  Plavix protocol per cardiologist at [facility]

## 2024-05-13 ENCOUNTER — APPOINTMENT (OUTPATIENT)
Dept: NEUROSURGERY | Facility: CLINIC | Age: 74
End: 2024-05-13
Payer: MEDICARE

## 2024-05-14 ENCOUNTER — TREATMENT (OUTPATIENT)
Dept: PHYSICAL THERAPY | Facility: CLINIC | Age: 74
End: 2024-05-14
Payer: MEDICARE

## 2024-05-14 DIAGNOSIS — M48.062 LUMBAR STENOSIS WITH NEUROGENIC CLAUDICATION: Primary | ICD-10-CM

## 2024-05-14 PROCEDURE — 97113 AQUATIC THERAPY/EXERCISES: CPT | Mod: GP

## 2024-05-14 NOTE — PROGRESS NOTES
Physical Therapy Treatment    Patient Name: Too Thornton  MRN: [id number]  Encounter date:  5/14/2024  Time Calculation  Start Time: 1255  Stop Time: 1340  Time Calculation (min): 45 min     PT Therapeutic Procedures Time Entry  Aquatic Therapy Time Entry: 40    Visit Number:  6 (including evaluation)  Planned total visits: 10  Visits Authorized/Insurance Coverage:  $15.00 [place], [place] [employer]     Current Problem  Problem List Items Addressed This Visit    None  Visit Diagnoses         Codes    Lumbar stenosis with neurogenic claudication    -  Primary M48.062                Precautions:  CANCER HISTORY- NO ULTRASOUND     Relevant PMH:  CANCER-prostate  CABG X 5  X 3 stents placed  HTN  OA  SPONDYLOSITHESIS L4-5- LIMIT EXTENSION  Neck pain    Pain  Up to 9/10  LBP/right LE pain    Subjective  General  Patient reports LBP with right LE as before.  Patient does report reduction of symptoms  in water    Objective  FHP  Guarded transitional movements  Improved ease of mobiity in water         Treatment:  Aquatic Therapy:   Enters pool with step to gait, BUE support on rails  Warm up: 3 laps across pool forward, bkwd, side to side      Standing at rail, each leg:    -Heel Raises x 20  -Toe raises x 20  -Hip abduction/side kicks 2 x 10  -Hip Ext/kick backs 2 x 10- small ROM  -SLR/Hip flexion/ forward kicks 2 x 10  -Hamstring curls 2 x 10   -Seated LAQ 2 x 10  -Minisquats 2 x 10     Deep end: 7' depth.  Completed:  - Hang with pool noodle to unweight LE and trunk x 2 minutes  - Hip abduction/side kicks 2 minutes  - Hang with pool noodle to unweight LE and trunk 2 minutes  - Bicycles x 2 minutes.  - Hang with pool noodle to unweight LE and trunkx 2 minutes.  - Scissor Kicking/forward-backward x 2 minutes- small ROM  - Hang with pool noodle to unweight LE and trunk x 2 minutes     1 lap across pool to re acclimate to gravity: Fwd, bkwd, side steps      DNP  Standing hamstring stretch at pool steps, 2  "x 20\" each leg     Current HEP:  Aquatics only at this time     Treatment:  Aquatic Therapy:   Enters pool with step to gait, BUE support on rails  Warm up: 3 laps across pool forward, bkwd, side to side      Standing at rail holding railing today  -Heel Raises x 20  -Toe raises x 20  -Hip abduction/side kicks 2 x 10  -Hip Ext/kick backs 2 x 10- small ROM  -SLR/Hip flexion/ forward kicks 2 x 10  -Hamstring curls 2 x 10   -Seated LAQ 2 x 10- DNP  -Minisquats 2 x 10     Deep end: 7' depth.  Completed:  - Hang with pool noodle to unweight LE and trunk x 2 minutes  - Hip abduction/side kicks 2 minutes  - Hang with pool noodle to unweight LE and trunk 2 minutes  - Bicycles x 2 minutes.  - Hang with pool noodle to unweight LE and trunkx 2 minutes.  - Scissor Kicking/forward-backward x 2 minutes- small ROM  - Hang with pool noodle to unweight LE and trunk x 2 minutes     1 lap across pool to re acclimate to gravity: Fwd, bkwd, side steps      DNP  Standing hamstring stretch at pool steps, 2 x 20\" each leg     Current HEP:  Aquatics only at this time    Activity tolerance:  good    OP EDUCATION:  Pace activity  Body mechanics    Assessment:     Pt's response to treatment:  good  Areas of improvements:  ease of mobility in water  Limitations/deficits:  pain- LBP/right  LE    Pain end of session:   Decreased LBP/LE pain    Plan:     Continue with current POC with the following changes advance as able    Assessment of current progress against goals:  Progressing toward functional goals         Goals:   STG(3 visits)  Patient to tolerate 35 minutes of aquatic PT     LTG(10 visits)  Patient to stand/walk for 15 minutes without need to sit  Oswestry to <40% impaired             "

## 2024-05-16 ENCOUNTER — PRE-ADMISSION TESTING (OUTPATIENT)
Dept: PREADMISSION TESTING | Facility: HOSPITAL | Age: 74
End: 2024-05-16
Payer: MEDICARE

## 2024-05-16 VITALS
HEIGHT: 74 IN | OXYGEN SATURATION: 94 % | DIASTOLIC BLOOD PRESSURE: 81 MMHG | HEART RATE: 66 BPM | BODY MASS INDEX: 30.88 KG/M2 | SYSTOLIC BLOOD PRESSURE: 136 MMHG | TEMPERATURE: 97.8 F | WEIGHT: 240.6 LBS

## 2024-05-16 DIAGNOSIS — Z01.818 PREOP EXAMINATION: Primary | ICD-10-CM

## 2024-05-16 DIAGNOSIS — Z79.01 LONG TERM CURRENT USE OF ANTICOAGULANT: ICD-10-CM

## 2024-05-16 DIAGNOSIS — R73.09 OTHER ABNORMAL GLUCOSE: ICD-10-CM

## 2024-05-16 DIAGNOSIS — M48.062 SPINAL STENOSIS OF LUMBAR REGION WITH NEUROGENIC CLAUDICATION: ICD-10-CM

## 2024-05-16 LAB
ABO GROUP (TYPE) IN BLOOD: NORMAL
ANION GAP SERPL CALC-SCNC: 14 MMOL/L (ref 10–20)
ANTIBODY SCREEN: NORMAL
APPEARANCE UR: CLEAR
APTT PPP: 29 SECONDS (ref 27–38)
BILIRUB UR STRIP.AUTO-MCNC: NEGATIVE MG/DL
BUN SERPL-MCNC: 18 MG/DL (ref 6–23)
CALCIUM SERPL-MCNC: 9.4 MG/DL (ref 8.6–10.6)
CHLORIDE SERPL-SCNC: 106 MMOL/L (ref 98–107)
CO2 SERPL-SCNC: 25 MMOL/L (ref 21–32)
COLOR UR: YELLOW
CREAT SERPL-MCNC: 1.08 MG/DL (ref 0.5–1.3)
EGFRCR SERPLBLD CKD-EPI 2021: 73 ML/MIN/1.73M*2
ERYTHROCYTE [DISTWIDTH] IN BLOOD BY AUTOMATED COUNT: 13.2 % (ref 11.5–14.5)
EST. AVERAGE GLUCOSE BLD GHB EST-MCNC: 126 MG/DL
GLUCOSE SERPL-MCNC: 87 MG/DL (ref 74–99)
GLUCOSE UR STRIP.AUTO-MCNC: NORMAL MG/DL
HBA1C MFR BLD: 6 %
HCT VFR BLD AUTO: 41.2 % (ref 41–52)
HGB BLD-MCNC: 13.5 G/DL (ref 13.5–17.5)
HOLD SPECIMEN: NORMAL
INR PPP: 1 (ref 0.9–1.1)
KETONES UR STRIP.AUTO-MCNC: NEGATIVE MG/DL
LEUKOCYTE ESTERASE UR QL STRIP.AUTO: NEGATIVE
MCH RBC QN AUTO: 30.8 PG (ref 26–34)
MCHC RBC AUTO-ENTMCNC: 32.8 G/DL (ref 32–36)
MCV RBC AUTO: 94 FL (ref 80–100)
MUCOUS THREADS #/AREA URNS AUTO: ABNORMAL /LPF
NITRITE UR QL STRIP.AUTO: NEGATIVE
NRBC BLD-RTO: 0.7 /100 WBCS (ref 0–0)
PH UR STRIP.AUTO: 6 [PH]
PLATELET # BLD AUTO: 297 X10*3/UL (ref 150–450)
POTASSIUM SERPL-SCNC: 4.7 MMOL/L (ref 3.5–5.3)
PROT UR STRIP.AUTO-MCNC: ABNORMAL MG/DL
PROTHROMBIN TIME: 11.6 SECONDS (ref 9.8–12.8)
RBC # BLD AUTO: 4.39 X10*6/UL (ref 4.5–5.9)
RBC # UR STRIP.AUTO: NEGATIVE /UL
RBC #/AREA URNS AUTO: ABNORMAL /HPF
RH FACTOR (ANTIGEN D): NORMAL
SODIUM SERPL-SCNC: 140 MMOL/L (ref 136–145)
SP GR UR STRIP.AUTO: 1.03
UROBILINOGEN UR STRIP.AUTO-MCNC: NORMAL MG/DL
WBC # BLD AUTO: 5.8 X10*3/UL (ref 4.4–11.3)
WBC #/AREA URNS AUTO: ABNORMAL /HPF

## 2024-05-16 PROCEDURE — 87081 CULTURE SCREEN ONLY: CPT

## 2024-05-16 PROCEDURE — 36415 COLL VENOUS BLD VENIPUNCTURE: CPT

## 2024-05-16 PROCEDURE — 86901 BLOOD TYPING SEROLOGIC RH(D): CPT

## 2024-05-16 PROCEDURE — 81001 URINALYSIS AUTO W/SCOPE: CPT

## 2024-05-16 PROCEDURE — 85610 PROTHROMBIN TIME: CPT

## 2024-05-16 PROCEDURE — 83036 HEMOGLOBIN GLYCOSYLATED A1C: CPT

## 2024-05-16 PROCEDURE — 99204 OFFICE O/P NEW MOD 45 MIN: CPT | Performed by: NURSE PRACTITIONER

## 2024-05-16 PROCEDURE — 85027 COMPLETE CBC AUTOMATED: CPT

## 2024-05-16 PROCEDURE — 80048 BASIC METABOLIC PNL TOTAL CA: CPT

## 2024-05-16 RX ORDER — CHLORHEXIDINE GLUCONATE ORAL RINSE 1.2 MG/ML
15 SOLUTION DENTAL SEE ADMIN INSTRUCTIONS
Qty: 473 ML | Refills: 0 | Status: ON HOLD | OUTPATIENT
Start: 2024-05-16 | End: 2024-05-30 | Stop reason: WASHOUT

## 2024-05-16 RX ORDER — CHLORHEXIDINE GLUCONATE 40 MG/ML
SOLUTION TOPICAL 2 TIMES DAILY
Qty: 473 ML | Refills: 0 | Status: SHIPPED | OUTPATIENT
Start: 2024-05-16 | End: 2024-05-21

## 2024-05-16 ASSESSMENT — DUKE ACTIVITY SCORE INDEX (DASI)
CAN YOU PARTICIPATE IN STRENOUS SPORTS LIKE SWIMMING, SINGLES TENNIS, FOOTBALL, BASKETBALL, OR SKIING: NO
CAN YOU RUN A SHORT DISTANCE: NO
CAN YOU HAVE SEXUAL RELATIONS: NO
CAN YOU PARTICIPATE IN MODERATE RECREATIONAL ACTIVITIES LIKE GOLF, BOWLING, DANCING, DOUBLES TENNIS OR THROWING A BASEBALL OR FOOTBALL: NO
CAN YOU DO YARD WORK LIKE RAKING LEAVES, WEEDING OR PUSHING A MOWER: NO
CAN YOU DO MODERATE WORK AROUND THE HOUSE LIKE VACUUMING, SWEEPING FLOORS OR CARRYING GROCERIES: YES
DASI METS SCORE: 5.1
CAN YOU DO HEAVY WORK AROUND THE HOUSE LIKE SCRUBBING FLOORS OR LIFTING AND MOVING HEAVY FURNITURE: NO
CAN YOU WALK INDOORS, SUCH AS AROUND YOUR HOUSE: YES
CAN YOU WALK A BLOCK OR TWO ON LEVEL GROUND: YES
TOTAL_SCORE: 18.95
CAN YOU TAKE CARE OF YOURSELF (EAT, DRESS, BATHE, OR USE TOILET): YES
CAN YOU DO LIGHT WORK AROUND THE HOUSE LIKE DUSTING OR WASHING DISHES: YES
CAN YOU CLIMB A FLIGHT OF STAIRS OR WALK UP A HILL: YES

## 2024-05-16 ASSESSMENT — LIFESTYLE VARIABLES: SMOKING_STATUS: NONSMOKER

## 2024-05-16 ASSESSMENT — ENCOUNTER SYMPTOMS
CARDIOVASCULAR NEGATIVE: 1
CHILLS: 0
EYES NEGATIVE: 1
NEUROLOGICAL NEGATIVE: 1
FEVER: 0
MYALGIAS: 1
ENDOCRINE NEGATIVE: 1
RESPIRATORY NEGATIVE: 1
ARTHRALGIAS: 1
NECK NEGATIVE: 1
GASTROINTESTINAL NEGATIVE: 1
CONSTITUTIONAL NEGATIVE: 1

## 2024-05-16 ASSESSMENT — PAIN - FUNCTIONAL ASSESSMENT: PAIN_FUNCTIONAL_ASSESSMENT: 0-10

## 2024-05-16 ASSESSMENT — PAIN SCALES - GENERAL: PAINLEVEL_OUTOF10: 5 - MODERATE PAIN

## 2024-05-16 NOTE — H&P (VIEW-ONLY)
CPM/PAT Evaluation       Name: Too Thornton (Too Thornton)  /Age: 1950/73 y.o.     Visit Type:   In-Person       Chief Complaint: Fusion Spine Transforaminal Interbody Lumbar with Navigation - Right     HPI Patient is a 73 year old male scheduled for surgery with Dr. Lele Krishna on 24 related to Acquired spondylolisthesis of lumbosacral region     Patient referred by Dr. Krishna for preoperative evaluation of depression, CAD, hyperlipidemia, hypertension, sleep apnea, MTHFR deficiency and homocystinuria, prostate cancer, spinal stenosis, arthritis, chronic pain disorder.     Past Medical History:   Diagnosis Date    Acquired spondylolisthesis of lumbosacral region     Anticoagulated     plavix    Arthritis     Back pain     BMI 30.0-30.9,adult     Chronic pain disorder     Colon polyp     Coronary artery disease     Depression     DJD (degenerative joint disease)     Epistaxis     History of blood transfusion     Hyperlipidemia     Hypertension     Joint pain     Lumbar disc disease     MTHFR (methylene THF reductase) deficiency and homocystinuria (Multi)     Prostate cancer (Multi)     no treatment    SCC (squamous cell carcinoma)     Sleep apnea     24: states has not used CPAP in nearly one year    Spinal stenosis     Vascular insufficiency      Past Surgical History:   Procedure Laterality Date    CARDIAC CATHETERIZATION      CAROTID STENT      CHOLECYSTECTOMY      COLONOSCOPY      CORONARY ANGIOPLASTY      CORONARY STENT PLACEMENT      OTHER SURGICAL HISTORY  2022    Cholecystectomy    PROSTATE BIOPSY      SKIN CANCER EXCISION       Family History   Problem Relation Name Age of Onset    Other (htn) Mother      Cancer Mother      Other (htn) Father      Heart disease Father      Diabetes Sister      Heart disease Sister      Other (liver transplant) Sister      Heart disease Brother       Allergies   Allergen Reactions    Statins-Hmg-Coa Reductase Inhibitors Other     Prior to  Admission medications    Medication Sig Start Date End Date Taking? Authorizing Provider   aspirin 81 mg EC tablet Take 1 tablet (81 mg) by mouth 2 times a day. 8/26/21   Historical Provider, MD   buPROPion (Wellbutrin) 75 mg tablet Take 1 tablet (75 mg) by mouth once daily. 1/8/24   Historical Provider, MD   clopidogrel (Plavix) 75 mg tablet Take 1 tablet (75 mg) by mouth once daily.    Historical Provider, MD   cyanocobalamin (Vitamin B-12) 1,000 mcg tablet Take 1 tablet (1,000 mcg) by mouth once daily. 3/25/21   Historical Provider, MD   ergocalciferol (Vitamin D-2) 1.25 MG (26829 UT) capsule Take 1 capsule (50,000 Units) by mouth 1 (one) time per week. 2/26/21   Historical Provider, MD   famotidine (Pepcid) 20 mg tablet Take 1 tablet (20 mg) by mouth 2 times a day.    Historical Provider, MD   FLUoxetine (PROzac) 40 mg capsule Take 1 capsule (40 mg) by mouth once daily. 6/14/21   Historical Provider, MD   furosemide (Lasix) 20 mg tablet Take 1 tablet (20 mg) by mouth once daily as needed.    Historical Provider, MD   metoprolol tartrate (Lopressor) 50 mg tablet Take 1 tablet by mouth once daily. 7/26/21   Historical Provider, MD   Repatha SureClick 140 mg/mL injection Inject 1 mL (140 mg) under the skin every 14 (fourteen) days.    Historical Provider, MD   rosuvastatin (Crestor) 5 mg tablet Take 1 tablet (5 mg) by mouth once daily. 1/21/21   Historical Provider, MD GARZA ROS:   Constitutional:   neg     no fever   no chills  Neuro/Psych:   neg    Eyes:   neg    Ears:   neg    Nose:   neg    Mouth:   neg    Throat:   neg    Neck:    Spinal stenosis in neck-painful at times  neg    Cardio:   neg    Respiratory:   neg    Endocrine:   neg    GI:   neg    :   neg    Musculoskeletal:    Neck, legs and back   arthralgias   myalgias  Hematologic:    States may have had a transfusion with CABG  neg     no history of blood transfusion   no blood clots  Skin:  neg      Physical Exam  Vitals reviewed.  "  Constitutional:       Appearance: Normal appearance. He is normal weight.   HENT:      Head: Normocephalic and atraumatic.      Nose: Nose normal.      Mouth/Throat:      Mouth: Mucous membranes are moist.      Pharynx: Oropharynx is clear.   Eyes:      Extraocular Movements: Extraocular movements intact.      Pupils: Pupils are equal, round, and reactive to light.   Neck:      Comments: Spinal stenosis in neck-painful at times  Cardiovascular:      Rate and Rhythm: Normal rate and regular rhythm.      Heart sounds: Normal heart sounds.   Pulmonary:      Effort: Pulmonary effort is normal.      Breath sounds: Normal breath sounds.   Abdominal:      General: Abdomen is flat. Bowel sounds are normal.      Palpations: Abdomen is soft.   Musculoskeletal:         General: Normal range of motion.      Cervical back: Normal range of motion and neck supple.   Skin:     General: Skin is warm and dry.   Neurological:      Mental Status: He is alert and oriented to person, place, and time.   Psychiatric:         Mood and Affect: Mood normal.         Behavior: Behavior normal.         Thought Content: Thought content normal.         Judgment: Judgment normal.        PAT AIRWAY:   Airway:     Mallampati::  III    Neck ROM::  Full   States nothing removable. Bridges in place    Visit Vitals  /81   Pulse 66   Temp 36.6 °C (97.8 °F) (Temporal)     Visit Vitals  /81   Pulse 66   Temp 36.6 °C (97.8 °F) (Temporal)   Ht 1.88 m (6' 2\")   Wt 109 kg (240 lb 9.6 oz)   SpO2 94%   BMI 30.89 kg/m²   Smoking Status Never   BSA 2.39 m²      DASI Risk Score      Flowsheet Row Most Recent Value   DASI SCORE 18.95   METS Score (Will be calculated only when all the questions are answered) 5.1          Caprini DVT Assessment      Flowsheet Row Most Recent Value   DVT Score 8   Current Status Major surgery planned, lasting over 3 hours   Age 60-75 years   BMI 30 or less          Modified Frailty Index      Flowsheet Row Most Recent " Value   Modified Frailty Index Calculator .0909          CHADS2 Stroke Risk  Current as of today        N/A 3 to 100%: High Risk   2 to < 3%: Medium Risk   0 to < 2%: Low Risk     Last Change: N/A          This score determines the patient's risk of having a stroke if the patient has atrial fibrillation.        This score is not applicable to this patient. Components are not calculated.          Revised Cardiac Risk Index      Flowsheet Row Most Recent Value   Revised Cardiac Risk Calculator 0          Apfel Simplified Score      Flowsheet Row Most Recent Value   Apfel Simplified Score Calculator 2          Risk Analysis Index Results This Encounter    No data found in the last 1 encounters.       Stop Bang Score      Flowsheet Row Most Recent Value   Do you snore loudly? 1   Do you often feel tired or fatigued after your sleep? 1   Has anyone ever observed you stop breathing in your sleep? 1   Do you have or are you being treated for high blood pressure? 1   Recent BMI (Calculated) 30.2   Is BMI greater than 35 kg/m2? 0=No   Age older than 50 years old? 1=Yes   Is your neck circumference greater than 17 inches (Male) or 16 inches (Female)? 0   Gender - Male 1=Yes   STOP-BANG Total Score 6          Assessment and Plan:     Neuro:   No neurologic diagnoses, however, the patient is at an increased risk for post operative delirium secondary to age >/= 65, depression, type and duration of surgery    The patient is at an increased risk for perioperative stroke secondary to cardiac disease, preoperative interrupton of antithrombotic, increased age, HTN, HLD, neuro surgery, general anesthesia, hypercoagulable state, and op time >2.5 hours    Patient given information on brain exercises and delirium prevention.    Depression   States currently controlled with bupropion and fluoxetine.    HEENT/Airway  No diagnosis or significant findings on chart review or clinical presentation and evaluation.    Cardiovascular    CAD,  hyperlipidemia, hypertension  Currently controlled with aspirin, Plavix, furosemide PRN, metoprolol tartrate, Repatha, and rosuvastatin.    BP today controlled at 136/81  No recent LDL noted    5-21-24: Cardiac clearance received from Dr. Ingram. Patient is ok to stop Plavix for 7 days but must continue aspirin 81mg. Dr. Krishna notified. Patient notified via phone call, Mr. Thornton also says he was notified by Dr. Ingram.     BRY BHAGAT  Follows with Dr. Melchor Ingram at [facility]. Last seen 1-22-24. Per note:  IMPRESSION:   Stable CAD. No angina, CHF. Excellent control of CAD risk factors.     ASSESSMENT/PLAN:           Patient doing well, continue current medications, return for follow-up in six months., Follow low fat low cholesterol diet, and Get more aerobic exercise. BP goal 130/80, LDl under 70.    Contact Information:  Melchor Ingram I, MD   [address], [address]   [place] [id number] [phone] (Work)   [phone] (Fax)     RCRI  The patient meets 0-1 RCRI criteria and therefore has a less than 1% risk of major adverse cardiac complications.  METS  The patient's functional capacity is greater than 4 METS.  MACE  30-day risk for MACE of 1 predictor, 6.0% risk for cardiac death, nonfatal myocardial infarction, and nonfatal cardiac arrest  DOM  0.1% risk for 26th to 50th percentile    1-22-24: EKG sinus rhythm per care everywhere    Pulmonary     Sleep apnea  States has not used his CPAP in more than one year due to discomfort with it.     STOP BANG Score of 6, which places patient at high risk for having SUSY.  ARISCAT 35, High, 42.1% risk of in-hospital postoperative pulmonary complications  PRODIGY 28, high of respiratory depression episode.    Patient given information on deep breathing exercises.     Renal  No diagnosis or significant findings on chart review or clinical presentation and evaluation.    Endocrine  No diagnosis or significant findings on chart review  or clinical presentation and evaluation.    Hematology    MTHFR deficiency and homocystinuria  Per Dr. Torie Noguera from visit 8-31-23 at [facility]:  no compelling reason to initiate treatment at this time for isolated + aparna     Antiplatelet management   The patient is currently receiving antiplatelet therapy for CAD.    Caprini score 8, high risk of VTE    Transfusion Evaluation  A type and screen was obtained given the likelihood for perioperative transfusion of blood or blood products.    Patient given information on DVT prevention.     GI  No diagnosis or significant findings on chart review or clinical presentation and evaluation.    Eat 10- 0  Apfel: 2 points 39% risk for post operative nausea/vomiting.     Genitourinary    Prostate cancer  Follows with Dr. Louis Mcfarland and Dr. Herve Underwood at [facility]    12-14-23: MRI of Prostate     10-23-24: PSA 4.28    ID  No diagnosis or significant findings on chart review or clinical presentation and evaluation.    MRSA swab ordered  UA with reflex culture ordered  Chlorhexidine mouth wash and body wash ordered    Musculoskeletal    Spinal stenosis, arthritis, chronic pain disorder  Scheduled for surgery. Not currently taking any medications    -Preoperative medication instructions were provided and reviewed with the patient.  Any additional testing or evaluation was explained to the patient.  NPO Instructions were discussed, and the patient's questions were answered prior to conclusion of this encounter    Labs ordered:    Recent Results (from the past 168 hour(s))   CBC    Collection Time: 05/16/24 11:01 AM   Result Value Ref Range    WBC 5.8 4.4 - 11.3 x10*3/uL    nRBC 0.7 (H) 0.0 - 0.0 /100 WBCs    RBC 4.39 (L) 4.50 - 5.90 x10*6/uL    Hemoglobin 13.5 13.5 - 17.5 g/dL    Hematocrit 41.2 41.0 - 52.0 %    MCV 94 80 - 100 fL    MCH 30.8 26.0 - 34.0 pg    MCHC 32.8 32.0 - 36.0 g/dL    RDW 13.2 11.5 - 14.5 %    Platelets 297 150 - 450 x10*3/uL   Basic Metabolic Panel     Collection Time: 05/16/24 11:01 AM   Result Value Ref Range    Glucose 87 74 - 99 mg/dL    Sodium 140 136 - 145 mmol/L    Potassium 4.7 3.5 - 5.3 mmol/L    Chloride 106 98 - 107 mmol/L    Bicarbonate 25 21 - 32 mmol/L    Anion Gap 14 10 - 20 mmol/L    Urea Nitrogen 18 6 - 23 mg/dL    Creatinine 1.08 0.50 - 1.30 mg/dL    eGFR 73 >60 mL/min/1.73m*2    Calcium 9.4 8.6 - 10.6 mg/dL   Coagulation Screen    Collection Time: 05/16/24 11:01 AM   Result Value Ref Range    Protime 11.6 9.8 - 12.8 seconds    INR 1.0 0.9 - 1.1    aPTT 29 27 - 38 seconds   Type And Screen    Collection Time: 05/16/24 11:01 AM   Result Value Ref Range    ABO TYPE A     Rh TYPE POS     ANTIBODY SCREEN NEG    Hemoglobin A1C    Collection Time: 05/16/24 11:01 AM   Result Value Ref Range    Hemoglobin A1C 6.0 (H) see below %    Estimated Average Glucose 126 Not Established mg/dL   Staphylococcus aureus/MRSA colonization, Culture    Collection Time: 05/16/24 11:01 AM    Specimen: Nares/Axilla/Groin; Swab   Result Value Ref Range    Staph/MRSA Screen Culture No Staphylococcus aureus isolated    Urinalysis with Reflex Culture and Microscopic    Collection Time: 05/16/24 11:01 AM   Result Value Ref Range    Color, Urine Yellow Light-Yellow, Yellow, Dark-Yellow    Appearance, Urine Clear Clear    Specific Gravity, Urine 1.027 1.005 - 1.035    pH, Urine 6.0 5.0, 5.5, 6.0, 6.5, 7.0, 7.5, 8.0    Protein, Urine 30 (1+) (A) NEGATIVE, 10 (TRACE), 20 (TRACE) mg/dL    Glucose, Urine Normal Normal mg/dL    Blood, Urine NEGATIVE NEGATIVE    Ketones, Urine NEGATIVE NEGATIVE mg/dL    Bilirubin, Urine NEGATIVE NEGATIVE    Urobilinogen, Urine Normal Normal mg/dL    Nitrite, Urine NEGATIVE NEGATIVE    Leukocyte Esterase, Urine NEGATIVE NEGATIVE   Extra Urine Gray Tube    Collection Time: 05/16/24 11:01 AM   Result Value Ref Range    Extra Tube Hold for add-ons.    Urinalysis Microscopic    Collection Time: 05/16/24 11:01 AM   Result Value Ref Range    WBC, Urine 1-5  1-5, NONE /HPF    RBC, Urine 6-10 (A) NONE, 1-2, 3-5 /HPF    Mucus, Urine 2+ Reference range not established. /LPF

## 2024-05-16 NOTE — CPM/PAT H&P
CPM/PAT Evaluation       Name: Too Thornton (Too Thornton)  /Age: 1950/73 y.o.     Visit Type:   In-Person       Chief Complaint: Fusion Spine Transforaminal Interbody Lumbar with Navigation - Right     HPI Patient is a 73 year old male scheduled for surgery with Dr. Lele Krishna on 24 related to Acquired spondylolisthesis of lumbosacral region     Patient referred by Dr. Krishna for preoperative evaluation of depression, CAD, hyperlipidemia, hypertension, sleep apnea, MTHFR deficiency and homocystinuria, prostate cancer, spinal stenosis, arthritis, chronic pain disorder.     Past Medical History:   Diagnosis Date    Acquired spondylolisthesis of lumbosacral region     Anticoagulated     plavix    Arthritis     Back pain     BMI 30.0-30.9,adult     Chronic pain disorder     Colon polyp     Coronary artery disease     Depression     DJD (degenerative joint disease)     Epistaxis     History of blood transfusion     Hyperlipidemia     Hypertension     Joint pain     Lumbar disc disease     MTHFR (methylene THF reductase) deficiency and homocystinuria (Multi)     Prostate cancer (Multi)     no treatment    SCC (squamous cell carcinoma)     Sleep apnea     24: states has not used CPAP in nearly one year    Spinal stenosis     Vascular insufficiency      Past Surgical History:   Procedure Laterality Date    CARDIAC CATHETERIZATION      CAROTID STENT      CHOLECYSTECTOMY      COLONOSCOPY      CORONARY ANGIOPLASTY      CORONARY STENT PLACEMENT      OTHER SURGICAL HISTORY  2022    Cholecystectomy    PROSTATE BIOPSY      SKIN CANCER EXCISION       Family History   Problem Relation Name Age of Onset    Other (htn) Mother      Cancer Mother      Other (htn) Father      Heart disease Father      Diabetes Sister      Heart disease Sister      Other (liver transplant) Sister      Heart disease Brother       Allergies   Allergen Reactions    Statins-Hmg-Coa Reductase Inhibitors Other     Prior to  Admission medications    Medication Sig Start Date End Date Taking? Authorizing Provider   aspirin 81 mg EC tablet Take 1 tablet (81 mg) by mouth 2 times a day. 8/26/21   Historical Provider, MD   buPROPion (Wellbutrin) 75 mg tablet Take 1 tablet (75 mg) by mouth once daily. 1/8/24   Historical Provider, MD   clopidogrel (Plavix) 75 mg tablet Take 1 tablet (75 mg) by mouth once daily.    Historical Provider, MD   cyanocobalamin (Vitamin B-12) 1,000 mcg tablet Take 1 tablet (1,000 mcg) by mouth once daily. 3/25/21   Historical Provider, MD   ergocalciferol (Vitamin D-2) 1.25 MG (26166 UT) capsule Take 1 capsule (50,000 Units) by mouth 1 (one) time per week. 2/26/21   Historical Provider, MD   famotidine (Pepcid) 20 mg tablet Take 1 tablet (20 mg) by mouth 2 times a day.    Historical Provider, MD   FLUoxetine (PROzac) 40 mg capsule Take 1 capsule (40 mg) by mouth once daily. 6/14/21   Historical Provider, MD   furosemide (Lasix) 20 mg tablet Take 1 tablet (20 mg) by mouth once daily as needed.    Historical Provider, MD   metoprolol tartrate (Lopressor) 50 mg tablet Take 1 tablet by mouth once daily. 7/26/21   Historical Provider, MD   Repatha SureClick 140 mg/mL injection Inject 1 mL (140 mg) under the skin every 14 (fourteen) days.    Historical Provider, MD   rosuvastatin (Crestor) 5 mg tablet Take 1 tablet (5 mg) by mouth once daily. 1/21/21   Historical Provider, MD GARZA ROS:   Constitutional:   neg     no fever   no chills  Neuro/Psych:   neg    Eyes:   neg    Ears:   neg    Nose:   neg    Mouth:   neg    Throat:   neg    Neck:    Spinal stenosis in neck-painful at times  neg    Cardio:   neg    Respiratory:   neg    Endocrine:   neg    GI:   neg    :   neg    Musculoskeletal:    Neck, legs and back   arthralgias   myalgias  Hematologic:    States may have had a transfusion with CABG  neg     no history of blood transfusion   no blood clots  Skin:  neg      Physical Exam  Vitals reviewed.  "  Constitutional:       Appearance: Normal appearance. He is normal weight.   HENT:      Head: Normocephalic and atraumatic.      Nose: Nose normal.      Mouth/Throat:      Mouth: Mucous membranes are moist.      Pharynx: Oropharynx is clear.   Eyes:      Extraocular Movements: Extraocular movements intact.      Pupils: Pupils are equal, round, and reactive to light.   Neck:      Comments: Spinal stenosis in neck-painful at times  Cardiovascular:      Rate and Rhythm: Normal rate and regular rhythm.      Heart sounds: Normal heart sounds.   Pulmonary:      Effort: Pulmonary effort is normal.      Breath sounds: Normal breath sounds.   Abdominal:      General: Abdomen is flat. Bowel sounds are normal.      Palpations: Abdomen is soft.   Musculoskeletal:         General: Normal range of motion.      Cervical back: Normal range of motion and neck supple.   Skin:     General: Skin is warm and dry.   Neurological:      Mental Status: He is alert and oriented to person, place, and time.   Psychiatric:         Mood and Affect: Mood normal.         Behavior: Behavior normal.         Thought Content: Thought content normal.         Judgment: Judgment normal.        PAT AIRWAY:   Airway:     Mallampati::  III    Neck ROM::  Full   States nothing removable. Bridges in place    Visit Vitals  /81   Pulse 66   Temp 36.6 °C (97.8 °F) (Temporal)     Visit Vitals  /81   Pulse 66   Temp 36.6 °C (97.8 °F) (Temporal)   Ht 1.88 m (6' 2\")   Wt 109 kg (240 lb 9.6 oz)   SpO2 94%   BMI 30.89 kg/m²   Smoking Status Never   BSA 2.39 m²      DASI Risk Score      Flowsheet Row Most Recent Value   DASI SCORE 18.95   METS Score (Will be calculated only when all the questions are answered) 5.1          Caprini DVT Assessment      Flowsheet Row Most Recent Value   DVT Score 8   Current Status Major surgery planned, lasting over 3 hours   Age 60-75 years   BMI 30 or less          Modified Frailty Index      Flowsheet Row Most Recent " Value   Modified Frailty Index Calculator .0909          CHADS2 Stroke Risk  Current as of today        N/A 3 to 100%: High Risk   2 to < 3%: Medium Risk   0 to < 2%: Low Risk     Last Change: N/A          This score determines the patient's risk of having a stroke if the patient has atrial fibrillation.        This score is not applicable to this patient. Components are not calculated.          Revised Cardiac Risk Index      Flowsheet Row Most Recent Value   Revised Cardiac Risk Calculator 0          Apfel Simplified Score      Flowsheet Row Most Recent Value   Apfel Simplified Score Calculator 2          Risk Analysis Index Results This Encounter    No data found in the last 1 encounters.       Stop Bang Score      Flowsheet Row Most Recent Value   Do you snore loudly? 1   Do you often feel tired or fatigued after your sleep? 1   Has anyone ever observed you stop breathing in your sleep? 1   Do you have or are you being treated for high blood pressure? 1   Recent BMI (Calculated) 30.2   Is BMI greater than 35 kg/m2? 0=No   Age older than 50 years old? 1=Yes   Is your neck circumference greater than 17 inches (Male) or 16 inches (Female)? 0   Gender - Male 1=Yes   STOP-BANG Total Score 6          Assessment and Plan:     Neuro:   No neurologic diagnoses, however, the patient is at an increased risk for post operative delirium secondary to age >/= 65, depression, type and duration of surgery    The patient is at an increased risk for perioperative stroke secondary to cardiac disease, preoperative interrupton of antithrombotic, increased age, HTN, HLD, neuro surgery, general anesthesia, hypercoagulable state, and op time >2.5 hours    Patient given information on brain exercises and delirium prevention.    Depression   States currently controlled with bupropion and fluoxetine.    HEENT/Airway  No diagnosis or significant findings on chart review or clinical presentation and evaluation.    Cardiovascular    CAD,  hyperlipidemia, hypertension  Currently controlled with aspirin, Plavix, furosemide PRN, metoprolol tartrate, Repatha, and rosuvastatin.    BP today controlled at 136/81  No recent LDL noted    5-21-24: Cardiac clearance received from Dr. Ingram. Patient is ok to stop Plavix for 7 days but must continue aspirin 81mg. Dr. Krishna notified. Patient notified via phone call, Mr. Thornton also says he was notified by Dr. Ingram.     BRY BHAGAT  Follows with Dr. Melchor Ingram at [facility]. Last seen 1-22-24. Per note:  IMPRESSION:   Stable CAD. No angina, CHF. Excellent control of CAD risk factors.     ASSESSMENT/PLAN:           Patient doing well, continue current medications, return for follow-up in six months., Follow low fat low cholesterol diet, and Get more aerobic exercise. BP goal 130/80, LDl under 70.    Contact Information:  Melchor Ingram I, MD   [address] [facility] [facility], [address]   [place] [id number] [phone] (Work)   [phone] (Fax)     RCRI  The patient meets 0-1 RCRI criteria and therefore has a less than 1% risk of major adverse cardiac complications.  METS  The patient's functional capacity is greater than 4 METS.  MACE  30-day risk for MACE of 1 predictor, 6.0% risk for cardiac death, nonfatal myocardial infarction, and nonfatal cardiac arrest  DOM  0.1% risk for 26th to 50th percentile    1-22-24: EKG sinus rhythm per care everywhere    Pulmonary     Sleep apnea  States has not used his CPAP in more than one year due to discomfort with it.     STOP BANG Score of 6, which places patient at high risk for having SUSY.  ARISCAT 35, High, 42.1% risk of in-hospital postoperative pulmonary complications  PRODIGY 28, high of respiratory depression episode.    Patient given information on deep breathing exercises.     Renal  No diagnosis or significant findings on chart review or clinical presentation and evaluation.    Endocrine  No diagnosis or significant findings on chart review  or clinical presentation and evaluation.    Hematology    MTHFR deficiency and homocystinuria  Per Dr. Torie Noguera from visit 8-31-23 at [facility]:  no compelling reason to initiate treatment at this time for isolated + aparna     Antiplatelet management   The patient is currently receiving antiplatelet therapy for CAD.    Caprini score 8, high risk of VTE    Transfusion Evaluation  A type and screen was obtained given the likelihood for perioperative transfusion of blood or blood products.    Patient given information on DVT prevention.     GI  No diagnosis or significant findings on chart review or clinical presentation and evaluation.    Eat 10- 0  Apfel: 2 points 39% risk for post operative nausea/vomiting.     Genitourinary    Prostate cancer  Follows with Dr. Louis Mcfarland and Dr. Herve Underwood at [facility]    12-14-23: MRI of Prostate     10-23-24: PSA 4.28    ID  No diagnosis or significant findings on chart review or clinical presentation and evaluation.    MRSA swab ordered  UA with reflex culture ordered  Chlorhexidine mouth wash and body wash ordered    Musculoskeletal    Spinal stenosis, arthritis, chronic pain disorder  Scheduled for surgery. Not currently taking any medications    -Preoperative medication instructions were provided and reviewed with the patient.  Any additional testing or evaluation was explained to the patient.  NPO Instructions were discussed, and the patient's questions were answered prior to conclusion of this encounter    Labs ordered:    Recent Results (from the past 168 hour(s))   CBC    Collection Time: 05/16/24 11:01 AM   Result Value Ref Range    WBC 5.8 4.4 - 11.3 x10*3/uL    nRBC 0.7 (H) 0.0 - 0.0 /100 WBCs    RBC 4.39 (L) 4.50 - 5.90 x10*6/uL    Hemoglobin 13.5 13.5 - 17.5 g/dL    Hematocrit 41.2 41.0 - 52.0 %    MCV 94 80 - 100 fL    MCH 30.8 26.0 - 34.0 pg    MCHC 32.8 32.0 - 36.0 g/dL    RDW 13.2 11.5 - 14.5 %    Platelets 297 150 - 450 x10*3/uL   Basic Metabolic Panel     Collection Time: 05/16/24 11:01 AM   Result Value Ref Range    Glucose 87 74 - 99 mg/dL    Sodium 140 136 - 145 mmol/L    Potassium 4.7 3.5 - 5.3 mmol/L    Chloride 106 98 - 107 mmol/L    Bicarbonate 25 21 - 32 mmol/L    Anion Gap 14 10 - 20 mmol/L    Urea Nitrogen 18 6 - 23 mg/dL    Creatinine 1.08 0.50 - 1.30 mg/dL    eGFR 73 >60 mL/min/1.73m*2    Calcium 9.4 8.6 - 10.6 mg/dL   Coagulation Screen    Collection Time: 05/16/24 11:01 AM   Result Value Ref Range    Protime 11.6 9.8 - 12.8 seconds    INR 1.0 0.9 - 1.1    aPTT 29 27 - 38 seconds   Type And Screen    Collection Time: 05/16/24 11:01 AM   Result Value Ref Range    ABO TYPE A     Rh TYPE POS     ANTIBODY SCREEN NEG    Hemoglobin A1C    Collection Time: 05/16/24 11:01 AM   Result Value Ref Range    Hemoglobin A1C 6.0 (H) see below %    Estimated Average Glucose 126 Not Established mg/dL   Staphylococcus aureus/MRSA colonization, Culture    Collection Time: 05/16/24 11:01 AM    Specimen: Nares/Axilla/Groin; Swab   Result Value Ref Range    Staph/MRSA Screen Culture No Staphylococcus aureus isolated    Urinalysis with Reflex Culture and Microscopic    Collection Time: 05/16/24 11:01 AM   Result Value Ref Range    Color, Urine Yellow Light-Yellow, Yellow, Dark-Yellow    Appearance, Urine Clear Clear    Specific Gravity, Urine 1.027 1.005 - 1.035    pH, Urine 6.0 5.0, 5.5, 6.0, 6.5, 7.0, 7.5, 8.0    Protein, Urine 30 (1+) (A) NEGATIVE, 10 (TRACE), 20 (TRACE) mg/dL    Glucose, Urine Normal Normal mg/dL    Blood, Urine NEGATIVE NEGATIVE    Ketones, Urine NEGATIVE NEGATIVE mg/dL    Bilirubin, Urine NEGATIVE NEGATIVE    Urobilinogen, Urine Normal Normal mg/dL    Nitrite, Urine NEGATIVE NEGATIVE    Leukocyte Esterase, Urine NEGATIVE NEGATIVE   Extra Urine Gray Tube    Collection Time: 05/16/24 11:01 AM   Result Value Ref Range    Extra Tube Hold for add-ons.    Urinalysis Microscopic    Collection Time: 05/16/24 11:01 AM   Result Value Ref Range    WBC, Urine 1-5  1-5, NONE /HPF    RBC, Urine 6-10 (A) NONE, 1-2, 3-5 /HPF    Mucus, Urine 2+ Reference range not established. /LPF

## 2024-05-16 NOTE — PREPROCEDURE INSTRUCTIONS
Thank you for visiting [facility] ([facility]) today for your pre-procedure evaluation, you were seen by     Arleth Genao, MSN, NP-C, CNP  Family Nurse Practitioner  Department of Anesthesiology and Perioperative Medicine  Main phone: [phone]  Fax :[phone]    This summary includes instructions and information to aid you during your perioperative period.  Please read carefully. If you have any questions about your visit today, please call the number listed above.  If you become ill or have any changes to your health before your surgery, please contact your primary care provider and alert your surgeon.    Preparing for your Surgery       Exercises  Preoperative Deep Breathing Exercises  Why it is important to do deep breathing exercises before my surgery?  Deep breathing exercises strengthen your breathing muscles.  This helps you to recover after your surgery and decreases the chance of breathing complications.  How are the deep breathing exercises done?  Sit straight with your back supported.  Breathe in deeply and slowly through your nose. Your lower rib cage should expand and your abdomen may move forward.  Hold that breath for 3 to 5 seconds.  Breathe out through pursed lips, slowly and completely.  Rest and repeat 10 times every hour while awake.  Rest longer if you become dizzy or lightheaded.    Preoperative Brain Exercises    What are brain exercises?  A brain exercise is any activity that engages your thinking (cognitive) skills.    What types of activities are considered brain exercises?  Jigsaw puzzles, crossword puzzles, word jumble, memory games, word search, and many more.  Many can be found free online or on your phone via a mobile irma.    Why should I do brain exercises before my surgery?  More recent research has shown brain exercise before surgery can lower the risk of postoperative delirium (confusion) which can be especially important for older adults.  Patients  who did brain exercises for 5 to 10 hours the days before surgery, cut their risk of postoperative delirium in half up to 1 week after surgery.        Instructions    Preoperative Fasting Guidelines    Why must I stop eating and drinking near surgery time?  With sedation, food or liquid in your stomach can enter your lungs causing serious complications  Food can increase nausea and vomiting  When do I need to stop eating and drinking before my surgery?      Do not eat any food after midnight the night before your surgery/procedure. You may have up to 13.5 ounces of clear liquid until TWO hours before your instructed arrival time to the hospital.  This includes water, black tea/coffee, (no milk or cream) apple juice, and electrolyte drinks (Gatorade). You may chew gum until TWO hours before your surgery/procedure        Simple things you can do to help prevent blood clots     Blood clots are blockages that can form in the body's veins. When a blood clot forms in your deep veins, it may be called a deep vein thrombosis, or DVT for short. Blood clots can happen in any part of the body where blood flows, but they are most common in the arms and legs. If a piece of a blood clot breaks free and travels to the lungs, it is called a pulmonary embolus (PE). A PE can be a very serious problem.         Being in the hospital or having surgery can raise your chances of getting a blood clot because you may not be well enough to move around as much as you normally do.         Ways you can help prevent blood clots in the hospital       Wearing SCDs  SCDs stands for Sequential Compression Devices.   SCDs are special sleeves that wrap around your legs. They attach to a pump that fills them with air to gently squeeze your legs every few minutes.  This helps return the blood in your legs to your heart.   SCDs should only be taken off when walking or bathing. SCDs may not be comfortable, but they can help save your life.               Pump SCD leg sleeves  Wearing compression stockings - if your doctor orders them. These special snug-fitting stockings gently squeeze your legs to help blood flow.       Walking. Walking helps move the blood in your legs.   If your doctor says it is ok, try walking the halls at least   5 times a day. Ask us to help you get up, so you don't fall.      Taking any blood-thinning medicines your doctor orders.              Ways you can help prevent blood clots at home         Wearing compression stockings - if your doctor orders them.   Walking - to help move the blood in your legs.    Taking any blood-thinning medicines your doctor orders.      Signs of a blood clot or PE    Tell your doctor or nurse right away if you have any of the problems listed below.         If you are at home, seek medical care right away. Call 911 for chest pain or problems breathing.            Signs of a blood clot (DVT) - such as pain, swelling, redness, or warmth in your arm or legs.  Signs of a pulmonary embolism (PE) - such as chest pain or feeling short of breath      Tobacco and Alcohol;  Do not drink alcohol or smoke within 24 hours of surgery.  It is best to quit smoking for as long as possible before any surgery or procedure.      The Week before Surgery        Seven days before Surgery  Check your CPM medication instructions  Do the exercises provided to you by CPM   Arrange for a responsible, adult licensed  to take you home after surgery and stay with you for 24 hours.  You will not be permitted to drive yourself home if you have received any anesthetic/sedation  Six days before surgery  Check your CPM medication instructions  Do the exercises provided to you by CPM   Start using Chlorhexidene (CHG) body wash if prescribed  Five days before surgery  Check your CPM medication instructions  Do the exercises provided to you by CPM   Continue to use CHG body wash if prescribed  Three days before surgery  Check your CPM  medication instructions  Do the exercises provided to you by CPM   Continue to use CHG body wash if prescribed  Two days before surgery  Check your CPM medication instructions  Do the exercises provided to you by CPM   Continue to use CHG body wash if prescribed    The Day before Surgery       Check your CPM medication and all other CPM instructions including when to stop eating and drinking  You will be called with your arrival time for surgery in the late afternoon.  If you do not receive a call please reach out to your surgeon's office.  Do not smoke or drink 24 hours before surgery  Prepare items to bring with you to the hospital  Shower with your chlorhexidine wash if prescribed  Brush your teeth and use your chlorhexidine dental rinse if prescribed    The Day of Surgery       Check your CPM medication instructions  Ensure you follow the instructions for when to stop eating and drinking  Shower, if prescribed use CHG.  Do not apply any lotions, creams, moisturizers, perfume or deodorant  Brush your teeth and use your CHG dental rinse if prescribed  Wear loose comfortable clothing  Avoid make-up  Remove  jewelry and piercings, consider professional piercing removal with a plastic spacer if needed  Bring photo ID and Insurance card  Bring an accurate medication list that includes medication dose, frequency and allergies  Bring a copy of your advanced directives (will, health care power of )  Bring any devices and controllers as well as medical devices you have been provided with for surgery (CPAP, slings, braces, etc.)  Dentures, eyeglasses, and contacts will be removed before surgery, please bring cases for contacts or glasses

## 2024-05-17 ENCOUNTER — TREATMENT (OUTPATIENT)
Dept: PHYSICAL THERAPY | Facility: CLINIC | Age: 74
End: 2024-05-17
Payer: MEDICARE

## 2024-05-17 DIAGNOSIS — M48.062 LUMBAR STENOSIS WITH NEUROGENIC CLAUDICATION: Primary | ICD-10-CM

## 2024-05-17 LAB — STAPHYLOCOCCUS SPEC CULT: NORMAL

## 2024-05-17 PROCEDURE — 97113 AQUATIC THERAPY/EXERCISES: CPT | Mod: GP

## 2024-05-17 NOTE — PROGRESS NOTES
"    Physical Therapy Treatment    Patient Name: Too Thornton  MRN: [id number]  Encounter date:  5/17/2024             Visit Number:  7 (including evaluation)  Planned total visits: 10  Visits Authorized/Insurance Coverage:  $15.00 CO PAY, [place]% [place], [place] AUTH AETNA     Current Problem  Problem List Items Addressed This Visit    None  Visit Diagnoses         Codes    Lumbar stenosis with neurogenic claudication    -  Primary M48.062                   Precautions:  CANCER HISTORY- NO ULTRASOUND     Relevant PMH:  CANCER-prostate  CABG X 5  X 3 stents placed  HTN  OA  SPONDYLOSITHESIS L4-5- LIMIT EXTENSION  Neck pain    Pain  6/10  LBP/right LE pain    Subjective  General  Patient in good spirits.  Patient reports good tolerance of treatment 5/14.  Patient reports pain no greater than 3/10 yesterday- back to baseline today.    Objective  Gait slow; modest guarding of WB right         Treatment:  Aquatic Therapy:   Enters pool with step to gait, BUE support on rails  Warm up: 3 laps across pool forward, bkwd, side to side      Standing at rail, each leg:    -Heel Raises x 20  -Toe raises x 20  -Hip abduction/side kicks 2 x 10  -Hip Ext/kick backs 2 x 10- small ROM  -SLR/Hip flexion/ forward kicks 2 x 10  -Hamstring curls 2 x 10   -Seated LAQ 2 x 10- DNP  -Minisquats 2 x 10     Deep end: 7' depth.  Completed:  - Hang with pool noodle to unweight LE and trunk x 2 minutes  - Hip abduction/side kicks 2 minutes  - Hang with pool noodle to unweight LE and trunk 2 minutes  - Bicycles x 2 minutes.  - Hang with pool noodle to unweight LE and trunkx 2 minutes.  - Scissor Kicking/forward-backward x 2 minutes- small ROM  - Hang with pool noodle to unweight LE and trunk x 2 minutes     2 laps across pool to re acclimate to gravity: Fwd, bkwd, side steps      DNP  Standing hamstring stretch at pool steps, 2 x 20\" each leg     Current HEP:  Aquatics only at this time    Activity tolerance:  good    OP EDUCATION:  Pace " activity    Assessment:  Patient responding favorably to gravity reduced atmosphere  Pt's response to treatment:  good  Areas of improvements:  reduced symptoms in wate  Limitations/deficits:  pain out of water    Pain end of session:   5/10    Plan:     Continue with current POC with the following changes advance as tolerated.  Patient scheduled for spinal fusion 5/30.    Assessment of current progress against goals:  Symptomatic relief with treatment and Pt improving but progress is slow      Goals:   STG(3 visits)  Patient to tolerate 35 minutes of aquatic PT     LTG(10 visits)  Patient to stand/walk for 15 minutes without need to sit  Oswestry to <40% impaired

## 2024-05-20 NOTE — PROGRESS NOTES
Physical Therapy Treatment    Patient Name: Too Thornton  MRN: [id number]  Encounter date:  5/21/2024  Time Calculation  Start Time: 1206  Stop Time: 1250  Time Calculation (min): 44 min  PT Therapeutic Procedures Time Entry  Aquatic Therapy Time Entry: 44     Visit Number:  8 (including evaluation)  Planned total visits: 10  Visits Authorized/Insurance Coverage:  $15.00 CO [place], [place] AET[employer]     Current Problem  Problem List Items Addressed This Visit    None  Visit Diagnoses         Codes    Lumbar stenosis with neurogenic claudication     M48.062          Precautions  CANCER HISTORY- NO ULTRASOUND     Pain  8/10    Subjective  General       Patient states he rested yesterday after being busy with ADLs recently, which may have increased his low back and lower extremity soreness at the start of the appointment today. Patient states he is scheduled for surgery 5/30/24.    Objective        Fair balance on forward and retro walking across pool, poor balance with side walking, drifts backwards as he progresses side to side.    Treatment:  Aquatic Therapy:   Enters pool with step to gait, BUE support on rails  Warm up: 3 laps across pool forward, bkwd, side to side      Standing at rail, each leg:    -Heel Raises x 20  -Toe raises x 20  -Hip abduction/side kicks 2 x 10  -Hip Ext/kick backs 2 x 10- small ROM  -SLR/Hip flexion/ forward kicks 2 x 10  -Hamstring curls 2 x 10   -Minisquats 2 x 10     Deep end: 7' depth.  Completed:  - Hang with pool noodle to unweight LE and trunk x 2 minutes  - Hip abduction/side kicks 2 minutes  - Hang with pool noodle to unweight LE and trunk 2 minutes  - Bicycles x 2 minutes  - Hang with pool noodle to unweight LE and trunkx 2 minutes.  - Scissor Kicking/forward-backward x 2 minutes- small ROM  - Hang with pool noodle to unweight LE and trunk x 2 minutes     2 laps across pool to re acclimate to gravity: Fwd, bkwd, side steps      Standing hamstring stretch at pool  "steps, 2 x 20\" each leg     Current HEP:  Aquatics only at this time     Activity tolerance:  Good    OP EDUCATION:    Assessment:     Clinician started the appointment late. Verbal and visual cueing provided to improve posture and technique on standing and floating therex. Decreased low back pain while suspended in deep end, returned when weight bearing on pool deck after treatment. Patient has two more physical therapy appointments scheduled before lumbar surgery 5/30/24.     Pt's response to treatment:  Good  Areas of improvements:  Decreased low back pain with floating/suspended activity  Limitations/deficits: Return of low back pain when full weight bearing on deck after      Pain end of session:     Plan:    Continue with current POC/no changes    Assessment of current progress against goals:  Progressing toward functional goals and Symptomatic relief with treatment    Goals:   STG(3 visits)  Patient to tolerate 35 minutes of aquatic PT     LTG(10 visits)  Patient to stand/walk for 15 minutes without need to sit  Oswestry to <40% impaired     "

## 2024-05-21 ENCOUNTER — TREATMENT (OUTPATIENT)
Dept: PHYSICAL THERAPY | Facility: CLINIC | Age: 74
End: 2024-05-21
Payer: MEDICARE

## 2024-05-21 DIAGNOSIS — M48.062 LUMBAR STENOSIS WITH NEUROGENIC CLAUDICATION: ICD-10-CM

## 2024-05-21 PROCEDURE — 97113 AQUATIC THERAPY/EXERCISES: CPT | Mod: CQ,GP | Performed by: SPECIALIST/TECHNOLOGIST

## 2024-05-22 NOTE — PROGRESS NOTES
"    Physical Therapy Treatment    Patient Name: Too Thornton  MRN: [id number]  Encounter date:  5/23/2024  Time Calculation  Start Time: 1204  Stop Time: 1249  Time Calculation (min): 45 min  PT Therapeutic Procedures Time Entry  Aquatic Therapy Time Entry: 45     Visit Number:  9 (including evaluation)  Planned total visits: 10  Visits Authorized/Insurance Coverage:  $15.00 CO [place]% [place], [place] A[employer]     Current Problem  Problem List Items Addressed This Visit    None  Visit Diagnoses         Codes    Lumbar stenosis with neurogenic claudication     M48.062          Precautions  CANCER HISTORY- NO ULTRASOUND     Pain  4/10    Subjective  General       Patient reports he is \"a little better\" vs yesterday. Patient reports 6-7/10 pain from an unknown cause that resulted in him staying in his recliner for the majority of the day.     Objective          Improved upright posture with cueing and balance on side stepping across pool during warm up and re acclimation to weight bearing.     Treatment:  Aquatic Therapy:   Enters pool with step to gait, BUE support on rails  Warm up: 3 laps across pool forward, bkwd, side to side      Standing at rail, each leg:    -Heel Raises x 20  -Toe raises x 20  -Hip abduction/side kicks 2 x 10  -Hip Ext/kick backs 2 x 10- small ROM  -SLR/Hip flexion/ forward kicks 2 x 10  -Hamstring curls 2 x 10   -Minisquats 2 x 10     Deep end: 7' depth.  Completed:  - Hang with pool noodle to unweight LE and trunk x 2 minutes  - Hip abduction/side kicks 2 minutes  - Hang with pool noodle to unweight LE and trunk 2 minutes  - Bicycles x 2 minutes  - Hang with pool noodle to unweight LE and trunkx 2 minutes.  - Scissor Kicking/forward-backward x 2 minutes- small ROM  - Hang with pool noodle to unweight LE and trunk x 2 minutes  - Clamshells 1' L/R  - Hang with pool noodle to unweight LE and trunk x 2 minutes     2 laps across pool to re acclimate to gravity: Fwd, bkwd, side steps    " "  Standing hamstring stretch at pool steps, 2 x 20\" each leg     Current HEP:  Aquatics only at this time     Activity tolerance:  Good    OP EDUCATION:    Assessment:        Patient utilizes BUE support on rails in standing and floating therex. Verbal and visual cueing to step closer to the rail and improve upright posture and to introduce clamshells during floating therex. Decreased low back pain following exercise, did not increase with return to weight bearing immediately after treatment. Patient is scheduled for one more visit, then surgery next week.     Pt's response to treatment:  Good  Areas of improvements: Improved posture on standing therex   Limitations/deficits: Remaining low back soreness with ADLs outside of     Pain end of session: 1/10    Plan:    Continue with current POC/no changes    Assessment of current progress against goals:  Progressing toward functional goals    Goals:   STG(3 visits)  Patient to tolerate 35 minutes of aquatic PT     LTG(10 visits)  Patient to stand/walk for 15 minutes without need to sit  Oswestry to <40% impaired  "

## 2024-05-23 ENCOUNTER — TREATMENT (OUTPATIENT)
Dept: PHYSICAL THERAPY | Facility: CLINIC | Age: 74
End: 2024-05-23
Payer: MEDICARE

## 2024-05-23 DIAGNOSIS — M48.062 LUMBAR STENOSIS WITH NEUROGENIC CLAUDICATION: ICD-10-CM

## 2024-05-23 PROCEDURE — 97113 AQUATIC THERAPY/EXERCISES: CPT | Mod: CQ,GP | Performed by: SPECIALIST/TECHNOLOGIST

## 2024-05-28 ENCOUNTER — TREATMENT (OUTPATIENT)
Dept: PHYSICAL THERAPY | Facility: CLINIC | Age: 74
End: 2024-05-28
Payer: MEDICARE

## 2024-05-28 DIAGNOSIS — M48.062 LUMBAR STENOSIS WITH NEUROGENIC CLAUDICATION: Primary | ICD-10-CM

## 2024-05-28 PROCEDURE — 97113 AQUATIC THERAPY/EXERCISES: CPT | Mod: GP

## 2024-05-28 NOTE — PROGRESS NOTES
"    Physical Therapy Treatment    Patient Name: Too Thornton  MRN: [id number]  Encounter date:  5/28/2024  Time Calculation  Start Time: 1105  Stop Time: 1150  Time Calculation (min): 45 min     PT Therapeutic Procedures Time Entry  Aquatic Therapy Time Entry: 40    Visit Number:  10 (including evaluation)  Planned total visits: 10  Visits Authorized/Insurance Coverage:  $15.00 [place], [place]% [place], [place] A[employer]     Current Problem  Problem List Items Addressed This Visit    None  Visit Diagnoses         Codes    Lumbar stenosis with neurogenic claudication    -  Primary M48.062              Precautions  CANCER HISTORY- NO ULTRASOUND           Pain  7/10    Subjective  General  Patient in good spirits and motivated for upcoming lumbar fusion 5/30.  Patient with LBP/LE symptoms as before.    Objective  Flexed trunk posture; less so in water  WOMAC  62% impaired    Treatment:  Aquatic Therapy:   Enters pool with step to gait, BUE support on rails  Warm up: 3 laps across pool forward, bkwd, side to side      Standing at rail, each leg:    -Heel Raises x 20  -Toe raises x 20  -Hip abduction/side kicks 2 x 10  -Hip Ext/kick backs 2 x 10- small ROM  -SLR/Hip flexion/ forward kicks 2 x 10  -Hamstring curls 2 x 10   -Minisquats 2 x 10     Deep end: 7' depth.  Completed:  - Hang with pool noodle to unweight LE and trunk x 2 minutes  - Hip abduction/side kicks 2 minutes  - Hang with pool noodle to unweight LE and trunk 2 minutes  - Bicycles x 2 minutes  - Hang with pool noodle to unweight LE and trunkx 2 minutes.  - Scissor Kicking/forward-backward x 2 minutes- small ROM  - Hang with pool noodle to unweight LE and trunk x 2 minutes  - Clamshells 1' L/R  - Hang with pool noodle to unweight LE and trunk x 2 minutes      2 laps across pool to re acclimate to gravity: Fwd, bkwd, side steps      Standing hamstring stretch at pool steps, 2 x 20\" each leg     Current HEP:  Aquatics only at this time       Activity " tolerance:  good    OP EDUCATION:  Body mechanics    Assessment:  Patient with short term symptom reduction only at this time  Pt's response to treatment:  good  Areas of improvements:  ease of mobility in water  Limitations/deficits:  pain out of water    Pain end of session:   5-6/10    Plan:     Discharge- patient to have lumbar fusion 5/30    Assessment of current progress against goals:  Symptomatic relief with treatment and Pt improving but progress is slow      Goals:   STG(3 visits)  Patient to tolerate 35 minutes of aquatic PT- MET     LTG(10 visits)  Patient to stand/walk for 15 minutes without need to sit- NOT MET  Oswestry to <40% impaired- NOT MET

## 2024-05-30 ENCOUNTER — ANESTHESIA (OUTPATIENT)
Dept: OPERATING ROOM | Facility: HOSPITAL | Age: 74
DRG: 454 | End: 2024-05-30
Payer: MEDICARE

## 2024-05-30 ENCOUNTER — ANESTHESIA EVENT (OUTPATIENT)
Dept: OPERATING ROOM | Facility: HOSPITAL | Age: 74
DRG: 454 | End: 2024-05-30
Payer: MEDICARE

## 2024-05-30 ENCOUNTER — HOSPITAL ENCOUNTER (INPATIENT)
Dept: RADIOLOGY | Facility: HOSPITAL | Age: 74
Discharge: HOME | DRG: 454 | End: 2024-05-30
Payer: MEDICARE

## 2024-05-30 ENCOUNTER — HOSPITAL ENCOUNTER (INPATIENT)
Facility: HOSPITAL | Age: 74
LOS: 3 days | Discharge: HOME | DRG: 454 | End: 2024-06-02
Attending: NEUROLOGICAL SURGERY | Admitting: NEUROLOGICAL SURGERY
Payer: MEDICARE

## 2024-05-30 ENCOUNTER — APPOINTMENT (OUTPATIENT)
Dept: RADIOLOGY | Facility: HOSPITAL | Age: 74
DRG: 454 | End: 2024-05-30
Payer: MEDICARE

## 2024-05-30 DIAGNOSIS — M43.17 ACQUIRED SPONDYLOLISTHESIS OF LUMBOSACRAL REGION: Primary | ICD-10-CM

## 2024-05-30 DIAGNOSIS — M48.062 SPINAL STENOSIS, LUMBAR REGION WITH NEUROGENIC CLAUDICATION: ICD-10-CM

## 2024-05-30 DIAGNOSIS — Z00.6 ENCOUNTER FOR EXAMINATION FOR NORMAL COMPARISON AND CONTROL IN CLINICAL RESEARCH PROGRAM: ICD-10-CM

## 2024-05-30 DIAGNOSIS — M48.02 SPINAL STENOSIS OF CERVICAL REGION: ICD-10-CM

## 2024-05-30 PROBLEM — M54.2 CHRONIC NECK PAIN: Status: ACTIVE | Noted: 2024-05-30

## 2024-05-30 PROBLEM — G89.4 CHRONIC PAIN SYNDROME: Status: ACTIVE | Noted: 2024-05-30

## 2024-05-30 PROBLEM — G89.29 CHRONIC LOW BACK PAIN: Status: ACTIVE | Noted: 2024-05-30

## 2024-05-30 PROBLEM — Z95.1 HISTORY OF CORONARY ARTERY BYPASS GRAFT: Status: ACTIVE | Noted: 2024-05-30

## 2024-05-30 PROBLEM — R94.31 ABNORMAL EKG: Status: ACTIVE | Noted: 2024-05-30

## 2024-05-30 PROBLEM — I25.10 CAD (CORONARY ARTERY DISEASE): Status: ACTIVE | Noted: 2024-05-30

## 2024-05-30 PROBLEM — Z79.01 ANTICOAGULANT LONG-TERM USE: Status: ACTIVE | Noted: 2024-05-30

## 2024-05-30 PROBLEM — G89.29 CHRONIC NECK PAIN: Status: ACTIVE | Noted: 2024-05-30

## 2024-05-30 PROBLEM — E72.11 MTHFR (METHYLENE THF REDUCTASE) DEFICIENCY AND HOMOCYSTINURIA (MULTI): Status: ACTIVE | Noted: 2024-05-30

## 2024-05-30 PROBLEM — M54.50 CHRONIC LOW BACK PAIN: Status: ACTIVE | Noted: 2024-05-30

## 2024-05-30 PROBLEM — Z95.5 STENTED CORONARY ARTERY: Status: ACTIVE | Noted: 2024-05-30

## 2024-05-30 PROBLEM — M48.00 SPINAL STENOSIS: Status: ACTIVE | Noted: 2024-05-30

## 2024-05-30 PROBLEM — E78.5 HYPERLIPIDEMIA: Status: ACTIVE | Noted: 2024-05-30

## 2024-05-30 PROBLEM — E72.12 MTHFR (METHYLENE THF REDUCTASE) DEFICIENCY AND HOMOCYSTINURIA (MULTI): Status: ACTIVE | Noted: 2024-05-30

## 2024-05-30 PROBLEM — G47.33 OSA (OBSTRUCTIVE SLEEP APNEA): Status: ACTIVE | Noted: 2024-05-30

## 2024-05-30 PROBLEM — I10 HTN (HYPERTENSION): Status: ACTIVE | Noted: 2024-05-30

## 2024-05-30 LAB
ABO GROUP (TYPE) IN BLOOD: NORMAL
ALBUMIN SERPL BCP-MCNC: 3.8 G/DL (ref 3.4–5)
ANION GAP BLDA CALCULATED.4IONS-SCNC: 11 MMO/L (ref 10–25)
ANION GAP SERPL CALC-SCNC: 13 MMOL/L (ref 10–20)
APTT PPP: 28 SECONDS (ref 27–38)
BASE EXCESS BLDA CALC-SCNC: -1.6 MMOL/L (ref -2–3)
BODY TEMPERATURE: 37 DEGREES CELSIUS
BUN SERPL-MCNC: 15 MG/DL (ref 6–23)
CA-I BLDA-SCNC: 1.19 MMOL/L (ref 1.1–1.33)
CALCIUM SERPL-MCNC: 8.7 MG/DL (ref 8.6–10.6)
CHLORIDE BLDA-SCNC: 105 MMOL/L (ref 98–107)
CHLORIDE SERPL-SCNC: 105 MMOL/L (ref 98–107)
CO2 SERPL-SCNC: 26 MMOL/L (ref 21–32)
CREAT SERPL-MCNC: 1.14 MG/DL (ref 0.5–1.3)
EGFRCR SERPLBLD CKD-EPI 2021: 68 ML/MIN/1.73M*2
ERYTHROCYTE [DISTWIDTH] IN BLOOD BY AUTOMATED COUNT: 13.2 % (ref 11.5–14.5)
GLUCOSE BLDA-MCNC: 115 MG/DL (ref 74–99)
GLUCOSE SERPL-MCNC: 135 MG/DL (ref 74–99)
HCO3 BLDA-SCNC: 23.7 MMOL/L (ref 22–26)
HCT VFR BLD AUTO: 33.7 % (ref 41–52)
HCT VFR BLD EST: 44 % (ref 41–52)
HGB BLD-MCNC: 10.9 G/DL (ref 13.5–17.5)
HGB BLDA-MCNC: 14.5 G/DL (ref 13.5–17.5)
INHALED O2 CONCENTRATION: 70 %
INR PPP: 1.1 (ref 0.9–1.1)
LACTATE BLDA-SCNC: 1.3 MMOL/L (ref 0.4–2)
MCH RBC QN AUTO: 29.9 PG (ref 26–34)
MCHC RBC AUTO-ENTMCNC: 32.3 G/DL (ref 32–36)
MCV RBC AUTO: 92 FL (ref 80–100)
NRBC BLD-RTO: 0 /100 WBCS (ref 0–0)
OXYHGB MFR BLDA: 97.3 % (ref 94–98)
PCO2 BLDA: 41 MM HG (ref 38–42)
PH BLDA: 7.37 PH (ref 7.38–7.42)
PHOSPHATE SERPL-MCNC: 3.7 MG/DL (ref 2.5–4.9)
PLATELET # BLD AUTO: 245 X10*3/UL (ref 150–450)
PO2 BLDA: 221 MM HG (ref 85–95)
POTASSIUM BLDA-SCNC: 5.4 MMOL/L (ref 3.5–5.3)
POTASSIUM SERPL-SCNC: 4.8 MMOL/L (ref 3.5–5.3)
PROTHROMBIN TIME: 12.3 SECONDS (ref 9.8–12.8)
RBC # BLD AUTO: 3.65 X10*6/UL (ref 4.5–5.9)
RH FACTOR (ANTIGEN D): NORMAL
SAO2 % BLDA: 99 % (ref 94–100)
SODIUM BLDA-SCNC: 134 MMOL/L (ref 136–145)
SODIUM SERPL-SCNC: 139 MMOL/L (ref 136–145)
WBC # BLD AUTO: 7.4 X10*3/UL (ref 4.4–11.3)

## 2024-05-30 PROCEDURE — 85610 PROTHROMBIN TIME: CPT | Performed by: STUDENT IN AN ORGANIZED HEALTH CARE EDUCATION/TRAINING PROGRAM

## 2024-05-30 PROCEDURE — 8E0WXBZ COMPUTER ASSISTED PROCEDURE OF TRUNK REGION: ICD-10-PCS | Performed by: NEUROLOGICAL SURGERY

## 2024-05-30 PROCEDURE — 61783 SCAN PROC SPINAL: CPT | Performed by: NEUROLOGICAL SURGERY

## 2024-05-30 PROCEDURE — 84132 ASSAY OF SERUM POTASSIUM: CPT | Mod: 91 | Performed by: ANESTHESIOLOGIST ASSISTANT

## 2024-05-30 PROCEDURE — 22840 INSERT SPINE FIXATION DEVICE: CPT | Performed by: NEUROLOGICAL SURGERY

## 2024-05-30 PROCEDURE — 2500000005 HC RX 250 GENERAL PHARMACY W/O HCPCS: Performed by: ANESTHESIOLOGIST ASSISTANT

## 2024-05-30 PROCEDURE — 2720000007 HC OR 272 NO HCPCS: Performed by: NEUROLOGICAL SURGERY

## 2024-05-30 PROCEDURE — 37799 UNLISTED PX VASCULAR SURGERY: CPT | Performed by: STUDENT IN AN ORGANIZED HEALTH CARE EDUCATION/TRAINING PROGRAM

## 2024-05-30 PROCEDURE — 3700000002 HC GENERAL ANESTHESIA TIME - EACH INCREMENTAL 1 MINUTE: Performed by: NEUROLOGICAL SURGERY

## 2024-05-30 PROCEDURE — C1713 ANCHOR/SCREW BN/BN,TIS/BN: HCPCS | Performed by: NEUROLOGICAL SURGERY

## 2024-05-30 PROCEDURE — 84132 ASSAY OF SERUM POTASSIUM: CPT | Performed by: STUDENT IN AN ORGANIZED HEALTH CARE EDUCATION/TRAINING PROGRAM

## 2024-05-30 PROCEDURE — 85027 COMPLETE CBC AUTOMATED: CPT | Performed by: STUDENT IN AN ORGANIZED HEALTH CARE EDUCATION/TRAINING PROGRAM

## 2024-05-30 PROCEDURE — 0SG00AJ FUSION OF LUMBAR VERTEBRAL JOINT WITH INTERBODY FUSION DEVICE, POSTERIOR APPROACH, ANTERIOR COLUMN, OPEN APPROACH: ICD-10-PCS | Performed by: NEUROLOGICAL SURGERY

## 2024-05-30 PROCEDURE — 7100000001 HC RECOVERY ROOM TIME - INITIAL BASE CHARGE: Performed by: NEUROLOGICAL SURGERY

## 2024-05-30 PROCEDURE — 0SG0071 FUSION OF LUMBAR VERTEBRAL JOINT WITH AUTOLOGOUS TISSUE SUBSTITUTE, POSTERIOR APPROACH, POSTERIOR COLUMN, OPEN APPROACH: ICD-10-PCS | Performed by: NEUROLOGICAL SURGERY

## 2024-05-30 PROCEDURE — 20930 SP BONE ALGRFT MORSEL ADD-ON: CPT | Performed by: NEUROLOGICAL SURGERY

## 2024-05-30 PROCEDURE — 22633 ARTHRD CMBN 1NTRSPC LUMBAR: CPT | Performed by: NEUROLOGICAL SURGERY

## 2024-05-30 PROCEDURE — 7100000002 HC RECOVERY ROOM TIME - EACH INCREMENTAL 1 MINUTE: Performed by: NEUROLOGICAL SURGERY

## 2024-05-30 PROCEDURE — 1100000001 HC PRIVATE ROOM DAILY

## 2024-05-30 PROCEDURE — 3700000001 HC GENERAL ANESTHESIA TIME - INITIAL BASE CHARGE: Performed by: NEUROLOGICAL SURGERY

## 2024-05-30 PROCEDURE — P9045 ALBUMIN (HUMAN), 5%, 250 ML: HCPCS | Mod: JZ | Performed by: ANESTHESIOLOGIST ASSISTANT

## 2024-05-30 PROCEDURE — 2500000005 HC RX 250 GENERAL PHARMACY W/O HCPCS: Performed by: NEUROLOGICAL SURGERY

## 2024-05-30 PROCEDURE — 2500000004 HC RX 250 GENERAL PHARMACY W/ HCPCS (ALT 636 FOR OP/ED): Performed by: ANESTHESIOLOGIST ASSISTANT

## 2024-05-30 PROCEDURE — 20931 SP BONE ALGRFT STRUCT ADD-ON: CPT | Performed by: NEUROLOGICAL SURGERY

## 2024-05-30 PROCEDURE — C9359 IMPLNT,BON VOID FILLER-PUTTY: HCPCS | Performed by: NEUROLOGICAL SURGERY

## 2024-05-30 PROCEDURE — C1776 JOINT DEVICE (IMPLANTABLE): HCPCS | Performed by: NEUROLOGICAL SURGERY

## 2024-05-30 PROCEDURE — 2780000003 HC OR 278 NO HCPCS: Performed by: NEUROLOGICAL SURGERY

## 2024-05-30 PROCEDURE — 3600000009 HC OR TIME - EACH INCREMENTAL 1 MINUTE - PROCEDURE LEVEL FOUR: Performed by: NEUROLOGICAL SURGERY

## 2024-05-30 PROCEDURE — 0SB20ZZ EXCISION OF LUMBAR VERTEBRAL DISC, OPEN APPROACH: ICD-10-PCS | Performed by: NEUROLOGICAL SURGERY

## 2024-05-30 PROCEDURE — 3600000004 HC OR TIME - INITIAL BASE CHARGE - PROCEDURE LEVEL FOUR: Performed by: NEUROLOGICAL SURGERY

## 2024-05-30 DEVICE — SCREW, RELINE LOCK, 5.5MM OPEN TULIP: Type: IMPLANTABLE DEVICE | Site: SPINE LUMBAR | Status: FUNCTIONAL

## 2024-05-30 DEVICE — SCREW, RELINE-O, 7.5X50MM 2S POLYAXIAL: Type: IMPLANTABLE DEVICE | Site: SPINE LUMBAR | Status: FUNCTIONAL

## 2024-05-30 DEVICE — ROD, RELINE-0, 5.5 X 40MM, LORDOTIC: Type: IMPLANTABLE DEVICE | Site: BACK | Status: FUNCTIONAL

## 2024-05-30 DEVICE — IMPLANTABLE DEVICE: Type: IMPLANTABLE DEVICE | Site: SPINE LUMBAR | Status: FUNCTIONAL

## 2024-05-30 DEVICE — IMPLANTABLE DEVICE: Type: IMPLANTABLE DEVICE | Site: BACK | Status: FUNCTIONAL

## 2024-05-30 DEVICE — SCREW, RELINE-O, 7.5X45MM 2S POLYAXIAL: Type: IMPLANTABLE DEVICE | Site: SPINE LUMBAR | Status: FUNCTIONAL

## 2024-05-30 RX ORDER — PHENYLEPHRINE 10 MG/250 ML(40 MCG/ML)IN 0.9 % SOD.CHLORIDE INTRAVENOUS
CONTINUOUS PRN
Status: DISCONTINUED | OUTPATIENT
Start: 2024-05-30 | End: 2024-05-30

## 2024-05-30 RX ORDER — HYDROMORPHONE HYDROCHLORIDE 1 MG/ML
0.4 INJECTION, SOLUTION INTRAMUSCULAR; INTRAVENOUS; SUBCUTANEOUS EVERY 5 MIN PRN
Status: DISCONTINUED | OUTPATIENT
Start: 2024-05-30 | End: 2024-05-30 | Stop reason: HOSPADM

## 2024-05-30 RX ORDER — ONDANSETRON HYDROCHLORIDE 2 MG/ML
4 INJECTION, SOLUTION INTRAVENOUS EVERY 8 HOURS PRN
Status: DISCONTINUED | OUTPATIENT
Start: 2024-05-30 | End: 2024-06-02 | Stop reason: HOSPADM

## 2024-05-30 RX ORDER — NALOXONE HYDROCHLORIDE 0.4 MG/ML
0.2 INJECTION, SOLUTION INTRAMUSCULAR; INTRAVENOUS; SUBCUTANEOUS EVERY 5 MIN PRN
Status: DISCONTINUED | OUTPATIENT
Start: 2024-05-30 | End: 2024-06-02 | Stop reason: HOSPADM

## 2024-05-30 RX ORDER — PROPOFOL 10 MG/ML
INJECTION, EMULSION INTRAVENOUS AS NEEDED
Status: DISCONTINUED | OUTPATIENT
Start: 2024-05-30 | End: 2024-05-30

## 2024-05-30 RX ORDER — LIDOCAINE HYDROCHLORIDE 10 MG/ML
0.1 INJECTION INFILTRATION; PERINEURAL ONCE
Status: DISCONTINUED | OUTPATIENT
Start: 2024-05-30 | End: 2024-05-30 | Stop reason: HOSPADM

## 2024-05-30 RX ORDER — CYCLOBENZAPRINE HCL 10 MG
10 TABLET ORAL 3 TIMES DAILY
Status: DISCONTINUED | OUTPATIENT
Start: 2024-05-30 | End: 2024-06-02 | Stop reason: HOSPADM

## 2024-05-30 RX ORDER — LIDOCAINE 560 MG/1
2 PATCH PERCUTANEOUS; TOPICAL; TRANSDERMAL DAILY
Status: DISCONTINUED | OUTPATIENT
Start: 2024-05-30 | End: 2024-06-02 | Stop reason: HOSPADM

## 2024-05-30 RX ORDER — OXYCODONE HYDROCHLORIDE 5 MG/1
5 TABLET ORAL EVERY 4 HOURS PRN
Status: DISCONTINUED | OUTPATIENT
Start: 2024-05-30 | End: 2024-05-30 | Stop reason: HOSPADM

## 2024-05-30 RX ORDER — OXYCODONE HYDROCHLORIDE 5 MG/1
10 TABLET ORAL EVERY 4 HOURS PRN
Status: DISCONTINUED | OUTPATIENT
Start: 2024-05-30 | End: 2024-06-02 | Stop reason: HOSPADM

## 2024-05-30 RX ORDER — KETOROLAC TROMETHAMINE 15 MG/ML
15 INJECTION, SOLUTION INTRAMUSCULAR; INTRAVENOUS EVERY 8 HOURS
Status: COMPLETED | OUTPATIENT
Start: 2024-05-31 | End: 2024-06-01

## 2024-05-30 RX ORDER — SODIUM CHLORIDE, SODIUM LACTATE, POTASSIUM CHLORIDE, CALCIUM CHLORIDE 600; 310; 30; 20 MG/100ML; MG/100ML; MG/100ML; MG/100ML
INJECTION, SOLUTION INTRAVENOUS CONTINUOUS PRN
Status: DISCONTINUED | OUTPATIENT
Start: 2024-05-30 | End: 2024-05-30

## 2024-05-30 RX ORDER — CEFAZOLIN SODIUM 2 G/100ML
2 INJECTION, SOLUTION INTRAVENOUS ONCE
Status: DISCONTINUED | OUTPATIENT
Start: 2024-05-30 | End: 2024-05-30 | Stop reason: HOSPADM

## 2024-05-30 RX ORDER — SODIUM CHLORIDE, SODIUM LACTATE, POTASSIUM CHLORIDE, CALCIUM CHLORIDE 600; 310; 30; 20 MG/100ML; MG/100ML; MG/100ML; MG/100ML
100 INJECTION, SOLUTION INTRAVENOUS CONTINUOUS
Status: DISCONTINUED | OUTPATIENT
Start: 2024-05-30 | End: 2024-05-30 | Stop reason: HOSPADM

## 2024-05-30 RX ORDER — FENTANYL CITRATE 50 UG/ML
INJECTION, SOLUTION INTRAMUSCULAR; INTRAVENOUS AS NEEDED
Status: DISCONTINUED | OUTPATIENT
Start: 2024-05-30 | End: 2024-05-30

## 2024-05-30 RX ORDER — ONDANSETRON HYDROCHLORIDE 2 MG/ML
4 INJECTION, SOLUTION INTRAVENOUS ONCE AS NEEDED
Status: DISCONTINUED | OUTPATIENT
Start: 2024-05-30 | End: 2024-05-30 | Stop reason: HOSPADM

## 2024-05-30 RX ORDER — ACETAMINOPHEN 325 MG/1
650 TABLET ORAL EVERY 4 HOURS PRN
Status: DISCONTINUED | OUTPATIENT
Start: 2024-05-30 | End: 2024-05-30 | Stop reason: HOSPADM

## 2024-05-30 RX ORDER — ROCURONIUM BROMIDE 10 MG/ML
INJECTION, SOLUTION INTRAVENOUS AS NEEDED
Status: DISCONTINUED | OUTPATIENT
Start: 2024-05-30 | End: 2024-05-30

## 2024-05-30 RX ORDER — HYDROMORPHONE HYDROCHLORIDE 1 MG/ML
0.2 INJECTION, SOLUTION INTRAMUSCULAR; INTRAVENOUS; SUBCUTANEOUS EVERY 4 HOURS PRN
Status: DISCONTINUED | OUTPATIENT
Start: 2024-05-30 | End: 2024-06-02 | Stop reason: HOSPADM

## 2024-05-30 RX ORDER — LIDOCAINE HCL/PF 100 MG/5ML
SYRINGE (ML) INTRAVENOUS AS NEEDED
Status: DISCONTINUED | OUTPATIENT
Start: 2024-05-30 | End: 2024-05-30

## 2024-05-30 RX ORDER — CEFAZOLIN 1 G/1
INJECTION, POWDER, FOR SOLUTION INTRAVENOUS AS NEEDED
Status: DISCONTINUED | OUTPATIENT
Start: 2024-05-30 | End: 2024-05-30

## 2024-05-30 RX ORDER — ALBUMIN HUMAN 50 G/1000ML
SOLUTION INTRAVENOUS AS NEEDED
Status: DISCONTINUED | OUTPATIENT
Start: 2024-05-30 | End: 2024-05-30

## 2024-05-30 RX ORDER — DEXMEDETOMIDINE HYDROCHLORIDE 4 UG/ML
INJECTION, SOLUTION INTRAVENOUS CONTINUOUS PRN
Status: DISCONTINUED | OUTPATIENT
Start: 2024-05-30 | End: 2024-05-30

## 2024-05-30 RX ORDER — ACETAMINOPHEN 325 MG/1
650 TABLET ORAL EVERY 6 HOURS
Status: DISCONTINUED | OUTPATIENT
Start: 2024-05-30 | End: 2024-06-02 | Stop reason: HOSPADM

## 2024-05-30 RX ORDER — ONDANSETRON HYDROCHLORIDE 2 MG/ML
INJECTION, SOLUTION INTRAVENOUS AS NEEDED
Status: DISCONTINUED | OUTPATIENT
Start: 2024-05-30 | End: 2024-05-30

## 2024-05-30 RX ORDER — HEPARIN SODIUM 5000 [USP'U]/ML
5000 INJECTION, SOLUTION INTRAVENOUS; SUBCUTANEOUS EVERY 8 HOURS
Status: DISCONTINUED | OUTPATIENT
Start: 2024-05-31 | End: 2024-05-30 | Stop reason: SDUPTHER

## 2024-05-30 RX ORDER — PHENYLEPHRINE HYDROCHLORIDE 10 MG/ML
INJECTION INTRAVENOUS AS NEEDED
Status: DISCONTINUED | OUTPATIENT
Start: 2024-05-30 | End: 2024-05-30

## 2024-05-30 RX ORDER — HYDROMORPHONE HYDROCHLORIDE 1 MG/ML
INJECTION, SOLUTION INTRAMUSCULAR; INTRAVENOUS; SUBCUTANEOUS AS NEEDED
Status: DISCONTINUED | OUTPATIENT
Start: 2024-05-30 | End: 2024-05-30

## 2024-05-30 RX ORDER — ONDANSETRON 4 MG/1
4 TABLET, FILM COATED ORAL EVERY 8 HOURS PRN
Status: DISCONTINUED | OUTPATIENT
Start: 2024-05-30 | End: 2024-06-02 | Stop reason: HOSPADM

## 2024-05-30 RX ORDER — DEXTROSE 50 % IN WATER (D50W) INTRAVENOUS SYRINGE
25
Status: DISCONTINUED | OUTPATIENT
Start: 2024-05-30 | End: 2024-06-02 | Stop reason: HOSPADM

## 2024-05-30 RX ORDER — ACETAMINOPHEN 325 MG/1
975 TABLET ORAL ONCE
Status: COMPLETED | OUTPATIENT
Start: 2024-05-30 | End: 2024-05-30

## 2024-05-30 RX ORDER — AMOXICILLIN 250 MG
2 CAPSULE ORAL 2 TIMES DAILY
Status: DISCONTINUED | OUTPATIENT
Start: 2024-05-30 | End: 2024-06-02 | Stop reason: HOSPADM

## 2024-05-30 RX ORDER — ALBUTEROL SULFATE 0.83 MG/ML
2.5 SOLUTION RESPIRATORY (INHALATION) ONCE AS NEEDED
Status: DISCONTINUED | OUTPATIENT
Start: 2024-05-30 | End: 2024-05-30 | Stop reason: HOSPADM

## 2024-05-30 RX ORDER — HEPARIN SODIUM 5000 [USP'U]/ML
5000 INJECTION, SOLUTION INTRAVENOUS; SUBCUTANEOUS EVERY 8 HOURS
Status: DISCONTINUED | OUTPATIENT
Start: 2024-05-31 | End: 2024-06-02 | Stop reason: HOSPADM

## 2024-05-30 RX ORDER — OXYCODONE HYDROCHLORIDE 5 MG/1
5 TABLET ORAL EVERY 4 HOURS PRN
Status: DISCONTINUED | OUTPATIENT
Start: 2024-05-30 | End: 2024-06-02 | Stop reason: HOSPADM

## 2024-05-30 RX ORDER — DEXMEDETOMIDINE IN 0.9 % NACL 20 MCG/5ML
SYRINGE (ML) INTRAVENOUS AS NEEDED
Status: DISCONTINUED | OUTPATIENT
Start: 2024-05-30 | End: 2024-05-30

## 2024-05-30 RX ORDER — DEXTROSE 50 % IN WATER (D50W) INTRAVENOUS SYRINGE
12.5
Status: DISCONTINUED | OUTPATIENT
Start: 2024-05-30 | End: 2024-06-02 | Stop reason: HOSPADM

## 2024-05-30 RX ADMIN — PROPOFOL 150 MG: 10 INJECTION, EMULSION INTRAVENOUS at 11:04

## 2024-05-30 RX ADMIN — DEXMEDETOMIDINE HYDROCHLORIDE 0.4 MCG/KG/HR: 4 INJECTION, SOLUTION INTRAVENOUS at 11:40

## 2024-05-30 RX ADMIN — PROPOFOL 20 MG: 10 INJECTION, EMULSION INTRAVENOUS at 14:32

## 2024-05-30 RX ADMIN — FENTANYL CITRATE 100 MCG: 50 INJECTION, SOLUTION INTRAMUSCULAR; INTRAVENOUS at 11:04

## 2024-05-30 RX ADMIN — ROCURONIUM BROMIDE 70 MG: 10 INJECTION INTRAVENOUS at 11:05

## 2024-05-30 RX ADMIN — ROCURONIUM BROMIDE 30 MG: 10 INJECTION INTRAVENOUS at 12:10

## 2024-05-30 RX ADMIN — PHENYLEPHRINE HYDROCHLORIDE 120 MCG: 10 INJECTION INTRAVENOUS at 11:09

## 2024-05-30 RX ADMIN — Medication 0.4 MCG/KG/MIN: at 12:00

## 2024-05-30 RX ADMIN — PROPOFOL 20 MG: 10 INJECTION, EMULSION INTRAVENOUS at 14:24

## 2024-05-30 RX ADMIN — Medication 0.2 MCG/KG/MIN: at 11:30

## 2024-05-30 RX ADMIN — PROPOFOL 10 MG: 10 INJECTION, EMULSION INTRAVENOUS at 14:26

## 2024-05-30 RX ADMIN — ALBUMIN HUMAN 250 ML: 0.05 INJECTION, SOLUTION INTRAVENOUS at 13:58

## 2024-05-30 RX ADMIN — DEXAMETHASONE SODIUM PHOSPHATE 10 MG: 4 INJECTION INTRA-ARTICULAR; INTRALESIONAL; INTRAMUSCULAR; INTRAVENOUS; SOFT TISSUE at 11:09

## 2024-05-30 RX ADMIN — PHENYLEPHRINE HYDROCHLORIDE 80 MCG: 10 INJECTION INTRAVENOUS at 14:44

## 2024-05-30 RX ADMIN — ACETAMINOPHEN 975 MG: 325 TABLET ORAL at 09:15

## 2024-05-30 RX ADMIN — HYDROMORPHONE HYDROCHLORIDE 0.4 MG: 1 INJECTION, SOLUTION INTRAMUSCULAR; INTRAVENOUS; SUBCUTANEOUS at 14:27

## 2024-05-30 RX ADMIN — PHENYLEPHRINE HYDROCHLORIDE 80 MCG: 10 INJECTION INTRAVENOUS at 14:35

## 2024-05-30 RX ADMIN — Medication 4 MCG: at 10:57

## 2024-05-30 RX ADMIN — ROCURONIUM BROMIDE 10 MG: 10 INJECTION INTRAVENOUS at 13:06

## 2024-05-30 RX ADMIN — ALBUMIN HUMAN 250 ML: 0.05 INJECTION, SOLUTION INTRAVENOUS at 12:00

## 2024-05-30 RX ADMIN — ONDANSETRON 4 MG: 2 INJECTION, SOLUTION INTRAMUSCULAR; INTRAVENOUS at 14:23

## 2024-05-30 RX ADMIN — PHENYLEPHRINE HYDROCHLORIDE 80 MCG: 10 INJECTION INTRAVENOUS at 12:12

## 2024-05-30 RX ADMIN — LIDOCAINE HYDROCHLORIDE 100 MG: 20 INJECTION, SOLUTION INTRAVENOUS at 11:04

## 2024-05-30 RX ADMIN — CEFAZOLIN 2 G: 1 INJECTION, POWDER, FOR SOLUTION INTRAMUSCULAR; INTRAVENOUS at 11:29

## 2024-05-30 RX ADMIN — SODIUM CHLORIDE, SODIUM LACTATE, POTASSIUM CHLORIDE, AND CALCIUM CHLORIDE: 600; 310; 30; 20 INJECTION, SOLUTION INTRAVENOUS at 11:17

## 2024-05-30 RX ADMIN — SODIUM CHLORIDE, POTASSIUM CHLORIDE, SODIUM LACTATE AND CALCIUM CHLORIDE: 600; 310; 30; 20 INJECTION, SOLUTION INTRAVENOUS at 10:50

## 2024-05-30 RX ADMIN — HYDROMORPHONE HYDROCHLORIDE 0.2 MG: 1 INJECTION, SOLUTION INTRAMUSCULAR; INTRAVENOUS; SUBCUTANEOUS at 14:49

## 2024-05-30 RX ADMIN — Medication 16 MCG: at 10:55

## 2024-05-30 RX ADMIN — PHENYLEPHRINE HYDROCHLORIDE 280 MCG: 10 INJECTION INTRAVENOUS at 11:18

## 2024-05-30 ASSESSMENT — COGNITIVE AND FUNCTIONAL STATUS - GENERAL
MOVING FROM LYING ON BACK TO SITTING ON SIDE OF FLAT BED WITH BEDRAILS: A LOT
HELP NEEDED FOR BATHING: A LOT
MOBILITY SCORE: 12
WALKING IN HOSPITAL ROOM: A LOT
DAILY ACTIVITIY SCORE: 12
MOVING TO AND FROM BED TO CHAIR: A LOT
TOILETING: A LOT
CLIMB 3 TO 5 STEPS WITH RAILING: A LOT
DRESSING REGULAR LOWER BODY CLOTHING: A LOT
DRESSING REGULAR UPPER BODY CLOTHING: A LOT
EATING MEALS: A LOT
TURNING FROM BACK TO SIDE WHILE IN FLAT BAD: A LOT
PERSONAL GROOMING: A LOT
STANDING UP FROM CHAIR USING ARMS: A LOT

## 2024-05-30 ASSESSMENT — PAIN SCALES - GENERAL
PAINLEVEL_OUTOF10: 0 - NO PAIN
PAINLEVEL_OUTOF10: 2
PAINLEVEL_OUTOF10: 0 - NO PAIN
PAINLEVEL_OUTOF10: 6
PAINLEVEL_OUTOF10: 0 - NO PAIN

## 2024-05-30 ASSESSMENT — PAIN - FUNCTIONAL ASSESSMENT
PAIN_FUNCTIONAL_ASSESSMENT: 0-10
PAIN_FUNCTIONAL_ASSESSMENT: UNABLE TO SELF-REPORT
PAIN_FUNCTIONAL_ASSESSMENT: 0-10
PAIN_FUNCTIONAL_ASSESSMENT: 0-10
PAIN_FUNCTIONAL_ASSESSMENT: UNABLE TO SELF-REPORT
PAIN_FUNCTIONAL_ASSESSMENT: 0-10
PAIN_FUNCTIONAL_ASSESSMENT: 0-10
PAIN_FUNCTIONAL_ASSESSMENT: UNABLE TO SELF-REPORT

## 2024-05-30 ASSESSMENT — COLUMBIA-SUICIDE SEVERITY RATING SCALE - C-SSRS
2. HAVE YOU ACTUALLY HAD ANY THOUGHTS OF KILLING YOURSELF?: NO
1. IN THE PAST MONTH, HAVE YOU WISHED YOU WERE DEAD OR WISHED YOU COULD GO TO SLEEP AND NOT WAKE UP?: NO
6. HAVE YOU EVER DONE ANYTHING, STARTED TO DO ANYTHING, OR PREPARED TO DO ANYTHING TO END YOUR LIFE?: NO

## 2024-05-30 NOTE — ANESTHESIA PROCEDURE NOTES
Arterial Line:    Date/Time: 5/30/2024 11:19 AM    Staffing  Performed: attending   Authorized by: Dasia Kolb MD    Performed by: ZEINAB Pace    An arterial line was placed. Procedure performed using surface landmarks.in the OR for the following indication(s): continuous blood pressure monitoring and blood sampling needed.    A 20 gauge (size) (length), Angiocath (type) catheter was placed into the Right radial artery, secured by Tegaderm,   Seldinger technique not used.  Events:  patient tolerated procedure well with no complications.

## 2024-05-30 NOTE — ANESTHESIA PROCEDURE NOTES
Airway  Date/Time: 5/30/2024 11:08 AM  Urgency: elective    Airway not difficult    Staffing  Performed: ZEINAB   Authorized by: Dasia Kolb MD    Performed by: ZEINAB Pace  Patient location during procedure: OR    Indications and Patient Condition  Indications for airway management: anesthesia and airway protection  Sedation level: deep  Preoxygenated: yes  Mask difficulty assessment: 2 - vent by mask + OA or adjuvant +/- NMBA    Final Airway Details  Final airway type: endotracheal airway      Successful airway: ETT  Cuffed: yes   Successful intubation technique: video laryngoscopy  Facilitating devices/methods: intubating stylet  Endotracheal tube insertion site: oral  Blade size: #4  Cormack-Lehane Classification: grade I - full view of glottis  Placement verified by: chest auscultation and capnometry   Measured from: lips  ETT to lips (cm): 22  Number of attempts at approach: 1  Ventilation between attempts: none  Number of other approaches attempted: 0

## 2024-05-30 NOTE — ANESTHESIA PROCEDURE NOTES
Peripheral IV  Date/Time: 5/30/2024 11:17 AM      Placement  Needle size: 16 G  Laterality: left  Location: forearm  Local anesthetic: none  Site prep: alcohol  Technique: anatomical landmarks  Attempts: 1

## 2024-05-30 NOTE — ANESTHESIA PREPROCEDURE EVALUATION
Patient: Too Thornton    Procedure Information       Date/Time: 05/30/24 1000    Procedure: Fusion Spine Transforaminal Interbody Lumbar with Navigation (Right)    Location: [facility] / Virtual [facility]    Surgeons: Lele Krishna MD            Relevant Problems   Anesthesia (within normal limits)      Cardiac  CAD s/p CABG x 5 (LIMA-LAD, BRENNA-D, RSVG-OM, RSVG-OM, RSVG-RCA) in 10/2016 Dr. Rashid. DAMON, Abnormal stress test, S/P PCI with WINDY to LMT, LCx, and SVG-OM 8/24/2021. He continued to experience DAMON post PCI which has improved markedly after switching from Brilinta to Plavix.  He was also started on Repatha.      He is off plavix for 5 days and has continued taking baby aspirin (81mg) as instructed by his cardiologist and CPM.   (+) Abnormal EKG   (+) CAD (coronary artery disease)   (+) HTN (hypertension)   (+) History of coronary artery bypass graft   (+) Hyperlipidemia   (+) Stented coronary artery      Pulmonary  Non-complaint with CPAP   (+) SUSY (obstructive sleep apnea)      Endocrine   (+) MTHFR (methylene THF reductase) deficiency and homocystinuria (Multi)      Hematology  MTHFR deficiency and homocystinuria   (+) Anticoagulant long-term use      Musculoskeletal   (+) Acquired spondylolisthesis of lumbosacral region   (+) Chronic low back pain   (+) Chronic neck pain   (+) Chronic pain syndrome   (+) Spinal stenosis       Clinical information reviewed:   Tobacco  Allergies    Med Hx  Surg Hx   Fam Hx  Soc Hx        NPO Detail:  NPO/Void Status  Date of Last Liquid: 05/29/24  Time of Last Liquid: 2200  Date of Last Solid: 05/29/24  Time of Last Solid: 2200  Last Intake Type: Clear fluids         Physical Exam    Airway  Mallampati: II  TM distance: >3 FB  Neck ROM: limited     Cardiovascular   Rhythm: regular  Rate: normal     Dental    Pulmonary   Breath sounds clear to auscultation  (+) decreased breath sounds     Abdominal      Other findings: Decreased RLL base  Restricted neck  ROM, discomfort during attempt- plan for videolaryngoscope        Anesthesia Plan    History of general anesthesia?: yes  History of complications of general anesthesia?: no    ASA 3     general   (GA ETT  plan for videolaryngoscope  Preop tylenol  Type and cross-second ABO  Discussed kurt for hemodynamic monitoring as necessary  PIV (large bore))  intravenous induction   Anesthetic plan and risks discussed with patient.  Use of blood products discussed with patient who consented to blood products.

## 2024-05-30 NOTE — HOSPITAL COURSE
Too Thornton is a 73 yr old M with h/o HTN, HLD, CAD s/p WINDY on DAPT, MTHFR deficiency and homocystinuria, prostate cancer, who presented with back pain and bilateral lower extremity radiculopathy (R>L).  On 5/30/2024 patient underwent a L4-5 TLIF. Patient's surgical drain was removed on post operative day 2 and he recovered well from surgery without complication.       On day of discharge patient was in satisfactory condition with follow up appointments arranged.

## 2024-05-30 NOTE — PROGRESS NOTES
Pharmacy Medication History Review    Too Thornton is a 73 y.o. male admitted for Acquired spondylolisthesis of lumbosacral region. Pharmacy reviewed the patient's bvwyr-nm-yarvhqhjr medications and allergies for accuracy.    The list below reflects the updated PTA list. Comments regarding how patient may be taking medications differently can be found in the Admit Orders Activity  Prior to Admission Medications   Prescriptions Last Dose Informant   FLUoxetine (PROzac) 40 mg capsule 5/30/2024 Self   Sig: Take 1 capsule (40 mg) by mouth once daily.   Repatha SureClick 140 mg/mL injection 5/29/2024 Self   Sig: Inject 1 mL (140 mg) under the skin every 14 (fourteen) days. on Wednesdays   aspirin 81 mg EC tablet 5/30/2024 Self   Sig: Take 1 tablet (81 mg) by mouth 2 times a day.   buPROPion (Wellbutrin) 75 mg tablet 5/29/2024 Self   Sig: Take 1 tablet (75 mg) by mouth once daily.   clopidogrel (Plavix) 75 mg tablet 5/25/2024 Self   Sig: Take 1 tablet (75 mg) by mouth once daily.   cyanocobalamin (Vitamin B-12) 1,000 mcg tablet 5/25/2024 Self   Sig: Take 1 tablet (1,000 mcg) by mouth once daily.   ergocalciferol (Vitamin D-2) 1.25 MG (05311 UT) capsule 5/26/2024 Self   Sig: Take 1 capsule (50,000 Units) by mouth 1 (one) time per week. on Sundays   famotidine (Pepcid) 20 mg tablet 5/30/2024 Self   Sig: Take 1 tablet (20 mg) by mouth 2 times a day.   furosemide (Lasix) 20 mg tablet Past Week Self   Sig: Take 1 tablet (20 mg) by mouth once daily as needed.   metoprolol tartrate (Lopressor) 50 mg tablet 5/30/2024 Self   Sig: Take 0.5 tablets by mouth 2 times a day.   rosuvastatin (Crestor) 5 mg tablet 5/29/2024 Self   Sig: Take 1 tablet (5 mg) by mouth once daily.      Facility-Administered Medications: None        The list below reflects the updated allergy list. Please review each documented allergy for additional clarification and justification.  Allergies  Reviewed by Lena Minor RN on 5/30/2024         Severity Reactions Comments    Statins-hmg-coa Reductase Inhibitors Medium Other             Patient declines M2B at discharge. Pharm[facility] [address] [place]      Sources:    -patient interview  -outpatient pharmacy dispense history  -Care Everywhere medication lists  -[place]ARRS    Below are additional concerns with the patient's PTA list: none    Jayy Gallardo, PharmD  Transitions of Care Pharmacist  [facility]  Please reach out via Secure Chat for questions, or if no response call [facility] in [place] or [facility]

## 2024-05-30 NOTE — OP NOTE
Fusion Spine Transforaminal Interbody Lumbar with Navigation (R) Operative Note     Date: 2024  OR Location: [facility] OR    Name: Too Thornton : 1950, Age: 73 y.o., MRN: [id number], Sex: male    Diagnosis  Pre-op Diagnosis     * Acquired spondylolisthesis of lumbosacral region [M43.17] Post-op Diagnosis     * Acquired spondylolisthesis of lumbosacral region [M43.17]     Procedures  Fusion Spine Transforaminal Interbody Lumbar with Navigation  80142 - ME ARTHRODESIS COMBINED TQ 1NTRSPC LUMBAR    1. L4-L5 decompressive laminectomy; partial facetectomies  2. Right L4-5 facetectomy with transforaminal lumbar interbody fusion  3. L4-L5 pedicle screw placement  4. Segmental arthrodesis; allograft; fluoroscopy; neuronavigation  Surgeons      * Lele Krishna - Primary    Resident/Fellow/Other Assistant:  Surgeons and Role:     * Gabe Euceda MD - Resident - Assisting     * Albert Bradshaw MD - Resident - Assisting    Procedure Summary  Anesthesia: General  ASA: III  Anesthesia Staff: Anesthesiologist: Dasia Kolb MD  CRNA: NIMISHA Birch-CRNA  C-AA: ZEINAB Pace  Estimated Blood Loss: 400mL  Intra-op Medications:   Administrations occurring from 1000 to 1520 on 24:   Medication Name Total Dose   lidocaine-epinephrine PF (Xylocaine W/EPI) 1 %-1:200,000 injection 10 mL              Anesthesia Record               Intraprocedure I/O Totals          Intake    Dexmedetomidine 0.00 mL    The total shown is the total volume documented since Anesthesia Start was filed.    Phenylephrine Drip 0.00 mL    The total shown is the total volume documented since Anesthesia Start was filed.    Total Intake 0 mL       Output    Urine 175 mL    Est. Blood Loss 400 mL    Total Output 575 mL       Net    Net Volume -575 mL          Specimen: No specimens collected     Staff:   Circulator: Erin Newton Person: Josias Multani Circulator: Danni Multani Scrub: Thomas Multani Scrub: Radha          Drains and/or Catheters:   Closed/Suction Drain Inferior;Midline Back Accordion 10 Fr. (Active)       Urethral Catheter 16 Fr. (Active)       Tourniquet Times:         Implants:  Implants       Type Name Action Serial No.      Screw SCREW, RELINE-O, 7.5X50MM 2S POLYAXIAL - [id number] Implanted      Screw SCREW, RELINE-O, 7.5X45MM 2S POLYAXIAL - [id number] Implanted      Neuro Interventional Implant SCREW, RELINE LOCK, 5.5MM OPEN TULIP - [id number] Implanted      Graft BONE GRAFT, OSTEOCEL PLUS, CELLULAR,MATRIX, 10CC - [id number] - [id number] Implanted [id number]      Modulus TLIF-A [id number] Implanted NA     Screw SERGEI, RELINE-0, 5.5 X 40MM, LORDOTIC - SNA - [id number] Implanted NA              Findings: good placement of hardware    Indications: Too Thornton is an 73 y.o. male who is having surgery for Acquired spondylolisthesis of lumbosacral region [M43.17].     The patient was seen in the preoperative area. The risks, benefits, complications, treatment options, non-operative alternatives, expected recovery and outcomes were discussed with the patient. The possibilities of reaction to medication, pulmonary aspiration, injury to surrounding structures, bleeding, recurrent infection, the need for additional procedures, failure to diagnose a condition, and creating a complication requiring transfusion or operation were discussed with the patient. The patient concurred with the proposed plan, giving informed consent.  The site of surgery was properly noted/marked if necessary per policy. The patient has been actively warmed in preoperative area. Preoperative antibiotics have been ordered and given within 1 hours of incision. Venous thrombosis prophylaxis have been ordered including bilateral sequential compression devices    Procedure Details:       The patient was brought back from PACU to operative suite by anesthesia. After patient was appropriately checked in by nursing staff, anesthesia was induced and  general endotracheal intubation was performed. After obtaining appropriate access by anesthesia, patient was flipped into prone position on Valentin table with hip pads with arms carefully placed on arm holders. The axillae and abdomen hung freely.    Patient was prepped and draped in usual sterile fashion. Incision was marked and 0.5% lidocaine with epinephrine was used to infiltrate skin under marked incision.     A combination of sharp and blunt electrocautery was used to expose spinous process, and lamina out to facet joint capsule and transvrse processes from the L3-4 facet joint to the L4-5 facet joint and associated transverse processes bilaterally with care taken to not violate the capsule at the upper instrumented level. Once adequate exposure was obtained, a spinous process clamp with stealth star was placed at the top exposed spinous process, and O-arm (News in Shorts) was brought in for an intra-operative CT.    After O-arm spin was completed and good registration was confirmed, screws were placed in usual fashion, first using tap under navigated guidance to create a screw trajectory in the pedicle, followed by confirmation that there was no breach in the pedicle using feeler ball, and finally, placing pedicle screws from L4 to L5 under navigated guidance. 7.5mm-diameter screws (NUVASIVE) were used at all levels. After satisfactory placement of screws, attention was then focused to the L4-5 decompression with right facetectomy, discectomy and interbody placement.     A laminectomy was carried out at the L4-5 level with a combination of Leksell rongeur, drill, curettes and kerrasin rongeurs were used to complete the laminectomy with care taken around dura. Osteotomies were made across the L4 pars to remove the inferior articulating process.  The superior articulating process of L5 was drilled down to expose the underlying disc space.    Discectomy was completed in standard fashion by first using a #11 blade to  create an annulotomy in the disc, and using sequentially enlarged disc space cecil to remove disc material from the endplates. Endplates were further prepared with curettes; once good disc preparation was completed, the disc space was packed with allograft (NUVASIVE, Osteocel plus) and a titanium interbody was placed and sequentially expanded under fluoroscopy.     Wound was copiously irrigated, and precut and contoured lordotic titanium rods (NUVASIVE) placed into tulipheads of screws. Set caps were used at all levels; and final tightened. final fluroscopy shots were taken prior to final tightening to confirm good placement of all hardware.    Native bone was decorticated with particular care taken at the transverse processes, and autograft and allograft (OSTEOCEL PLUS) were used for fusion material.     Incision was copiously irrigated with Irricept, followed by antibiotic-impregnated saline. One 10-round drains were placed in the subfascial space and tunneled out laterally to the incision. 0-vicryl suture was used to close the muscle over the laminectomy defect, followed by another layer of 0-vicryl suture was used to close the entirety of the linear fascial defect. 2-0 vicryls were used to approximate the skin edges and a mesh-based skin glue scaffold (EXOPHIN Fusion) was placed on the skin. The drain was secured with a 2-0 silk suture.     The patient was carefully flipped into supine position on patient cart, and turned over to anesthesia to extubate. All counts were correct at end of case without any obvious complication.    Complications:  None; patient tolerated the procedure well.    Disposition: PACU - hemodynamically stable.  Condition: stable         Additional Details: none    Attending Attestation: I was present and scrubbed for the key portions of the procedure.    Lele Krishna  Phone Number: [phone]

## 2024-05-30 NOTE — ANESTHESIA POSTPROCEDURE EVALUATION
Patient: Too Thornton    Procedure Summary       Date: 05/30/24 Room / Location: [facility] OR 23 / [facility]    Anesthesia Start: 1051 Anesthesia Stop: 1516    Procedure: Fusion Spine Transforaminal Interbody Lumbar with Navigation (Right) Diagnosis:       Acquired spondylolisthesis of lumbosacral region      (Acquired spondylolisthesis of lumbosacral region [M43.17])    Surgeons: Lele Krishna MD Responsible Provider: Dasia Kolb MD    Anesthesia Type: general ASA Status: 3            Anesthesia Type: general    Vitals Value Taken Time   BP 96/53 05/30/24 1515   Temp 35.9 °C (96.6 °F) 05/30/24 1508   Pulse 54 05/30/24 1523   Resp 13 05/30/24 1523   SpO2 93 % 05/30/24 1523   Vitals shown include unfiled device data.    Anesthesia Post Evaluation    Patient location during evaluation: PACU  Patient participation: complete - patient participated  Level of consciousness: awake and alert  Pain management: satisfactory to patient  Airway patency: patent  Cardiovascular status: acceptable  Respiratory status: acceptable  Hydration status: acceptable  Postoperative Nausea and Vomiting: none        There were no known notable events for this encounter.

## 2024-05-31 ENCOUNTER — APPOINTMENT (OUTPATIENT)
Dept: RADIOLOGY | Facility: HOSPITAL | Age: 74
DRG: 454 | End: 2024-05-31
Payer: MEDICARE

## 2024-05-31 LAB
ALBUMIN SERPL BCP-MCNC: 3.3 G/DL (ref 3.4–5)
ANION GAP SERPL CALC-SCNC: 12 MMOL/L (ref 10–20)
BUN SERPL-MCNC: 20 MG/DL (ref 6–23)
CALCIUM SERPL-MCNC: 8.5 MG/DL (ref 8.6–10.6)
CHLORIDE SERPL-SCNC: 105 MMOL/L (ref 98–107)
CO2 SERPL-SCNC: 25 MMOL/L (ref 21–32)
CREAT SERPL-MCNC: 1.21 MG/DL (ref 0.5–1.3)
EGFRCR SERPLBLD CKD-EPI 2021: 64 ML/MIN/1.73M*2
ERYTHROCYTE [DISTWIDTH] IN BLOOD BY AUTOMATED COUNT: 13.2 % (ref 11.5–14.5)
GLUCOSE SERPL-MCNC: 119 MG/DL (ref 74–99)
HCT VFR BLD AUTO: 30.5 % (ref 41–52)
HGB BLD-MCNC: 9.9 G/DL (ref 13.5–17.5)
MCH RBC QN AUTO: 30.7 PG (ref 26–34)
MCHC RBC AUTO-ENTMCNC: 32.5 G/DL (ref 32–36)
MCV RBC AUTO: 94 FL (ref 80–100)
NRBC BLD-RTO: 0 /100 WBCS (ref 0–0)
PHOSPHATE SERPL-MCNC: 3.8 MG/DL (ref 2.5–4.9)
PLATELET # BLD AUTO: 240 X10*3/UL (ref 150–450)
POTASSIUM SERPL-SCNC: 4.8 MMOL/L (ref 3.5–5.3)
RBC # BLD AUTO: 3.23 X10*6/UL (ref 4.5–5.9)
SODIUM SERPL-SCNC: 137 MMOL/L (ref 136–145)
WBC # BLD AUTO: 9.7 X10*3/UL (ref 4.4–11.3)

## 2024-05-31 PROCEDURE — 72100 X-RAY EXAM L-S SPINE 2/3 VWS: CPT

## 2024-05-31 PROCEDURE — 97116 GAIT TRAINING THERAPY: CPT | Mod: GP

## 2024-05-31 PROCEDURE — 2500000005 HC RX 250 GENERAL PHARMACY W/O HCPCS: Performed by: STUDENT IN AN ORGANIZED HEALTH CARE EDUCATION/TRAINING PROGRAM

## 2024-05-31 PROCEDURE — 85027 COMPLETE CBC AUTOMATED: CPT

## 2024-05-31 PROCEDURE — 37799 UNLISTED PX VASCULAR SURGERY: CPT

## 2024-05-31 PROCEDURE — 80069 RENAL FUNCTION PANEL: CPT

## 2024-05-31 PROCEDURE — 72100 X-RAY EXAM L-S SPINE 2/3 VWS: CPT | Performed by: RADIOLOGY

## 2024-05-31 PROCEDURE — 2500000002 HC RX 250 W HCPCS SELF ADMINISTERED DRUGS (ALT 637 FOR MEDICARE OP, ALT 636 FOR OP/ED): Performed by: STUDENT IN AN ORGANIZED HEALTH CARE EDUCATION/TRAINING PROGRAM

## 2024-05-31 PROCEDURE — 97535 SELF CARE MNGMENT TRAINING: CPT | Mod: GO

## 2024-05-31 PROCEDURE — 2500000001 HC RX 250 WO HCPCS SELF ADMINISTERED DRUGS (ALT 637 FOR MEDICARE OP): Performed by: STUDENT IN AN ORGANIZED HEALTH CARE EDUCATION/TRAINING PROGRAM

## 2024-05-31 PROCEDURE — 97165 OT EVAL LOW COMPLEX 30 MIN: CPT | Mod: GO

## 2024-05-31 PROCEDURE — 97161 PT EVAL LOW COMPLEX 20 MIN: CPT | Mod: GP

## 2024-05-31 PROCEDURE — 2500000004 HC RX 250 GENERAL PHARMACY W/ HCPCS (ALT 636 FOR OP/ED)

## 2024-05-31 PROCEDURE — 1100000001 HC PRIVATE ROOM DAILY

## 2024-05-31 PROCEDURE — 2500000004 HC RX 250 GENERAL PHARMACY W/ HCPCS (ALT 636 FOR OP/ED): Performed by: STUDENT IN AN ORGANIZED HEALTH CARE EDUCATION/TRAINING PROGRAM

## 2024-05-31 RX ORDER — FAMOTIDINE 10 MG/ML
20 INJECTION INTRAVENOUS ONCE
Status: DISCONTINUED | OUTPATIENT
Start: 2024-05-31 | End: 2024-05-31

## 2024-05-31 RX ORDER — LANOLIN ALCOHOL/MO/W.PET/CERES
1000 CREAM (GRAM) TOPICAL
Status: DISCONTINUED | OUTPATIENT
Start: 2024-05-31 | End: 2024-06-02 | Stop reason: HOSPADM

## 2024-05-31 RX ORDER — FAMOTIDINE 20 MG/1
20 TABLET, FILM COATED ORAL 2 TIMES DAILY
Status: DISCONTINUED | OUTPATIENT
Start: 2024-05-31 | End: 2024-06-02 | Stop reason: HOSPADM

## 2024-05-31 RX ORDER — ERGOCALCIFEROL 1.25 MG/1
50000 CAPSULE ORAL
Status: DISCONTINUED | OUTPATIENT
Start: 2024-06-02 | End: 2024-06-02 | Stop reason: HOSPADM

## 2024-05-31 RX ORDER — FAMOTIDINE 20 MG/1
20 TABLET, FILM COATED ORAL ONCE
Status: DISCONTINUED | OUTPATIENT
Start: 2024-05-31 | End: 2024-06-02 | Stop reason: HOSPADM

## 2024-05-31 RX ORDER — BUPROPION HYDROCHLORIDE 75 MG/1
75 TABLET ORAL
Status: DISCONTINUED | OUTPATIENT
Start: 2024-05-31 | End: 2024-06-02 | Stop reason: HOSPADM

## 2024-05-31 RX ORDER — METOPROLOL TARTRATE 50 MG/1
50 TABLET ORAL DAILY
Status: DISCONTINUED | OUTPATIENT
Start: 2024-05-31 | End: 2024-06-02 | Stop reason: HOSPADM

## 2024-05-31 RX ORDER — FLUOXETINE HYDROCHLORIDE 20 MG/1
40 CAPSULE ORAL
Status: DISCONTINUED | OUTPATIENT
Start: 2024-05-31 | End: 2024-06-02 | Stop reason: HOSPADM

## 2024-05-31 RX ORDER — ASPIRIN 81 MG/1
81 TABLET ORAL 2 TIMES DAILY
Status: DISCONTINUED | OUTPATIENT
Start: 2024-05-31 | End: 2024-06-02 | Stop reason: HOSPADM

## 2024-05-31 RX ORDER — ROSUVASTATIN CALCIUM 5 MG/1
5 TABLET, COATED ORAL
Status: DISCONTINUED | OUTPATIENT
Start: 2024-05-31 | End: 2024-06-02 | Stop reason: HOSPADM

## 2024-05-31 RX ADMIN — METOPROLOL TARTRATE 50 MG: 50 TABLET, FILM COATED ORAL at 11:48

## 2024-05-31 RX ADMIN — SENNOSIDES AND DOCUSATE SODIUM 2 TABLET: 50; 8.6 TABLET ORAL at 21:17

## 2024-05-31 RX ADMIN — CYANOCOBALAMIN TAB 1000 MCG 1000 MCG: 1000 TAB at 11:48

## 2024-05-31 RX ADMIN — ASPIRIN 81 MG: 81 TABLET, COATED ORAL at 21:17

## 2024-05-31 RX ADMIN — CYCLOBENZAPRINE 10 MG: 10 TABLET, FILM COATED ORAL at 15:53

## 2024-05-31 RX ADMIN — CYCLOBENZAPRINE 10 MG: 10 TABLET, FILM COATED ORAL at 00:04

## 2024-05-31 RX ADMIN — SENNOSIDES AND DOCUSATE SODIUM 2 TABLET: 50; 8.6 TABLET ORAL at 08:38

## 2024-05-31 RX ADMIN — ASPIRIN 81 MG: 81 TABLET, COATED ORAL at 11:48

## 2024-05-31 RX ADMIN — KETOROLAC TROMETHAMINE 15 MG: 15 INJECTION, SOLUTION INTRAMUSCULAR; INTRAVENOUS at 15:53

## 2024-05-31 RX ADMIN — FAMOTIDINE 20 MG: 20 TABLET, FILM COATED ORAL at 21:17

## 2024-05-31 RX ADMIN — ACETAMINOPHEN 650 MG: 325 TABLET ORAL at 18:02

## 2024-05-31 RX ADMIN — ACETAMINOPHEN 650 MG: 325 TABLET ORAL at 00:04

## 2024-05-31 RX ADMIN — FAMOTIDINE 20 MG: 20 TABLET, FILM COATED ORAL at 11:48

## 2024-05-31 RX ADMIN — HEPARIN SODIUM 5000 UNITS: 5000 INJECTION INTRAVENOUS; SUBCUTANEOUS at 21:17

## 2024-05-31 RX ADMIN — ACETAMINOPHEN 650 MG: 325 TABLET ORAL at 05:36

## 2024-05-31 RX ADMIN — CYCLOBENZAPRINE 10 MG: 10 TABLET, FILM COATED ORAL at 08:38

## 2024-05-31 RX ADMIN — BUPROPION HYDROCHLORIDE 75 MG: 75 TABLET, FILM COATED ORAL at 11:48

## 2024-05-31 RX ADMIN — CYCLOBENZAPRINE 10 MG: 10 TABLET, FILM COATED ORAL at 21:17

## 2024-05-31 RX ADMIN — OXYCODONE HYDROCHLORIDE 5 MG: 5 TABLET ORAL at 08:38

## 2024-05-31 RX ADMIN — HEPARIN SODIUM 5000 UNITS: 5000 INJECTION INTRAVENOUS; SUBCUTANEOUS at 15:54

## 2024-05-31 RX ADMIN — FLUOXETINE 40 MG: 20 CAPSULE ORAL at 11:48

## 2024-05-31 RX ADMIN — SENNOSIDES AND DOCUSATE SODIUM 2 TABLET: 50; 8.6 TABLET ORAL at 00:04

## 2024-05-31 RX ADMIN — KETOROLAC TROMETHAMINE 15 MG: 15 INJECTION, SOLUTION INTRAMUSCULAR; INTRAVENOUS at 23:53

## 2024-05-31 RX ADMIN — LIDOCAINE 2 PATCH: 4 PATCH TOPICAL at 08:38

## 2024-05-31 RX ADMIN — KETOROLAC TROMETHAMINE 15 MG: 15 INJECTION, SOLUTION INTRAMUSCULAR; INTRAVENOUS at 00:03

## 2024-05-31 RX ADMIN — ROSUVASTATIN 5 MG: 5 TABLET, FILM COATED ORAL at 11:48

## 2024-05-31 RX ADMIN — KETOROLAC TROMETHAMINE 15 MG: 15 INJECTION, SOLUTION INTRAMUSCULAR; INTRAVENOUS at 08:39

## 2024-05-31 RX ADMIN — HEPARIN SODIUM 5000 UNITS: 5000 INJECTION INTRAVENOUS; SUBCUTANEOUS at 05:36

## 2024-05-31 RX ADMIN — ACETAMINOPHEN 650 MG: 325 TABLET ORAL at 11:48

## 2024-05-31 RX ADMIN — ACETAMINOPHEN 650 MG: 325 TABLET ORAL at 23:53

## 2024-05-31 SDOH — ECONOMIC STABILITY: INCOME INSECURITY: IN THE LAST 12 MONTHS, WAS THERE A TIME WHEN YOU WERE NOT ABLE TO PAY THE MORTGAGE OR RENT ON TIME?: NO

## 2024-05-31 SDOH — SOCIAL STABILITY: SOCIAL INSECURITY: DOES ANYONE TRY TO KEEP YOU FROM HAVING/CONTACTING OTHER FRIENDS OR DOING THINGS OUTSIDE YOUR HOME?: NO

## 2024-05-31 SDOH — ECONOMIC STABILITY: HOUSING INSECURITY
IN THE LAST 12 MONTHS, WAS THERE A TIME WHEN YOU DID NOT HAVE A STEADY PLACE TO SLEEP OR SLEPT IN A SHELTER (INCLUDING NOW)?: NO

## 2024-05-31 SDOH — SOCIAL STABILITY: SOCIAL INSECURITY: DO YOU FEEL UNSAFE GOING BACK TO THE PLACE WHERE YOU ARE LIVING?: NO

## 2024-05-31 SDOH — SOCIAL STABILITY: SOCIAL INSECURITY: HAVE YOU HAD ANY THOUGHTS OF HARMING ANYONE ELSE?: NO

## 2024-05-31 SDOH — SOCIAL STABILITY: SOCIAL INSECURITY: HAVE YOU HAD THOUGHTS OF HARMING ANYONE ELSE?: NO

## 2024-05-31 SDOH — SOCIAL STABILITY: SOCIAL INSECURITY: WERE YOU ABLE TO COMPLETE ALL THE BEHAVIORAL HEALTH SCREENINGS?: YES

## 2024-05-31 SDOH — SOCIAL STABILITY: SOCIAL INSECURITY: DO YOU FEEL ANYONE HAS EXPLOITED OR TAKEN ADVANTAGE OF YOU FINANCIALLY OR OF YOUR PERSONAL PROPERTY?: NO

## 2024-05-31 SDOH — SOCIAL STABILITY: SOCIAL INSECURITY: HAS ANYONE EVER THREATENED TO HURT YOUR FAMILY OR YOUR PETS?: NO

## 2024-05-31 SDOH — SOCIAL STABILITY: SOCIAL INSECURITY: ARE YOU OR HAVE YOU BEEN THREATENED OR ABUSED PHYSICALLY, EMOTIONALLY, OR SEXUALLY BY ANYONE?: NO

## 2024-05-31 SDOH — ECONOMIC STABILITY: TRANSPORTATION INSECURITY
IN THE PAST 12 MONTHS, HAS LACK OF TRANSPORTATION KEPT YOU FROM MEETINGS, WORK, OR FROM GETTING THINGS NEEDED FOR DAILY LIVING?: NO

## 2024-05-31 SDOH — ECONOMIC STABILITY: HOUSING INSECURITY: IN THE LAST 12 MONTHS, HOW MANY PLACES HAVE YOU LIVED?: 1

## 2024-05-31 SDOH — SOCIAL STABILITY: SOCIAL INSECURITY: ARE THERE ANY APPARENT SIGNS OF INJURIES/BEHAVIORS THAT COULD BE RELATED TO ABUSE/NEGLECT?: NO

## 2024-05-31 SDOH — ECONOMIC STABILITY: INCOME INSECURITY: HOW HARD IS IT FOR YOU TO PAY FOR THE VERY BASICS LIKE FOOD, HOUSING, MEDICAL CARE, AND HEATING?: NOT HARD AT ALL

## 2024-05-31 SDOH — ECONOMIC STABILITY: TRANSPORTATION INSECURITY
IN THE PAST 12 MONTHS, HAS THE LACK OF TRANSPORTATION KEPT YOU FROM MEDICAL APPOINTMENTS OR FROM GETTING MEDICATIONS?: NO

## 2024-05-31 SDOH — SOCIAL STABILITY: SOCIAL INSECURITY: ABUSE: ADULT

## 2024-05-31 ASSESSMENT — PAIN DESCRIPTION - LOCATION: LOCATION: BACK

## 2024-05-31 ASSESSMENT — PAIN SCALES - WONG BAKER: WONGBAKER_NUMERICALRESPONSE: NO HURT

## 2024-05-31 ASSESSMENT — ACTIVITIES OF DAILY LIVING (ADL)
ADL_ASSISTANCE: INDEPENDENT
HOME_MANAGEMENT_TIME_ENTRY: 12
FEEDING YOURSELF: NEEDS ASSISTANCE
ADL_ASSISTANCE: INDEPENDENT
BATHING_ASSISTANCE: MINIMAL
TOILETING: NEEDS ASSISTANCE
PATIENT'S MEMORY ADEQUATE TO SAFELY COMPLETE DAILY ACTIVITIES?: YES
ADEQUATE_TO_COMPLETE_ADL: YES
GROOMING: NEEDS ASSISTANCE
WALKS IN HOME: INDEPENDENT
HEARING - RIGHT EAR: FUNCTIONAL
DRESSING YOURSELF: NEEDS ASSISTANCE
BATHING: NEEDS ASSISTANCE
JUDGMENT_ADEQUATE_SAFELY_COMPLETE_DAILY_ACTIVITIES: YES
HEARING - LEFT EAR: FUNCTIONAL

## 2024-05-31 ASSESSMENT — PAIN SCALES - GENERAL
PAINLEVEL_OUTOF10: 0 - NO PAIN
PAINLEVEL_OUTOF10: 1
PAINLEVEL_OUTOF10: 4
PAINLEVEL_OUTOF10: 1
PAINLEVEL_OUTOF10: 0 - NO PAIN
PAINLEVEL_OUTOF10: 1
PAINLEVEL_OUTOF10: 1
PAINLEVEL_OUTOF10: 4

## 2024-05-31 ASSESSMENT — PAIN SCALES - PAIN ASSESSMENT IN ADVANCED DEMENTIA (PAINAD)
CONSOLABILITY: NO NEED TO CONSOLE
BREATHING: NORMAL
TOTALSCORE: MEDICATION (SEE MAR)
FACIALEXPRESSION: SMILING OR INEXPRESSIVE
TOTALSCORE: 0
BODYLANGUAGE: RELAXED

## 2024-05-31 ASSESSMENT — COGNITIVE AND FUNCTIONAL STATUS - GENERAL
DAILY ACTIVITIY SCORE: 16
DRESSING REGULAR LOWER BODY CLOTHING: A LITTLE
EATING MEALS: A LITTLE
TURNING FROM BACK TO SIDE WHILE IN FLAT BAD: A LITTLE
STANDING UP FROM CHAIR USING ARMS: A LOT
CLIMB 3 TO 5 STEPS WITH RAILING: TOTAL
MOBILITY SCORE: 16
STANDING UP FROM CHAIR USING ARMS: A LITTLE
HELP NEEDED FOR BATHING: A LITTLE
TOILETING: A LOT
DAILY ACTIVITIY SCORE: 20
CLIMB 3 TO 5 STEPS WITH RAILING: A LOT
TURNING FROM BACK TO SIDE WHILE IN FLAT BAD: A LITTLE
MOBILITY SCORE: 15
DRESSING REGULAR UPPER BODY CLOTHING: A LOT
WALKING IN HOSPITAL ROOM: A LITTLE
TOILETING: A LITTLE
WALKING IN HOSPITAL ROOM: A LOT
PATIENT BASELINE BEDBOUND: NO
PERSONAL GROOMING: A LITTLE
DRESSING REGULAR LOWER BODY CLOTHING: A LITTLE
MOVING TO AND FROM BED TO CHAIR: A LITTLE
PERSONAL GROOMING: A LITTLE
MOVING FROM LYING ON BACK TO SITTING ON SIDE OF FLAT BED WITH BEDRAILS: A LITTLE
MOVING FROM LYING ON BACK TO SITTING ON SIDE OF FLAT BED WITH BEDRAILS: A LITTLE
MOVING TO AND FROM BED TO CHAIR: A LITTLE
HELP NEEDED FOR BATHING: A LITTLE

## 2024-05-31 ASSESSMENT — PAIN - FUNCTIONAL ASSESSMENT
PAIN_FUNCTIONAL_ASSESSMENT: 0-10

## 2024-05-31 ASSESSMENT — LIFESTYLE VARIABLES
SKIP TO QUESTIONS 9-10: 1
AUDIT-C TOTAL SCORE: 1
HOW MANY STANDARD DRINKS CONTAINING ALCOHOL DO YOU HAVE ON A TYPICAL DAY: 1 OR 2
AUDIT-C TOTAL SCORE: 1
HOW OFTEN DO YOU HAVE A DRINK CONTAINING ALCOHOL: MONTHLY OR LESS
HOW OFTEN DO YOU HAVE 6 OR MORE DRINKS ON ONE OCCASION: NEVER

## 2024-05-31 NOTE — PROGRESS NOTES
I met with Too at the bedside regarding discharge planning and home going needs. Patient lives home with his spouse [phone] where he is usually independent with ADL's he does have a walker in the home. Patient is not medically cleared  for discharge at this time pending drain removal and therapy recommendations. I will continue to follow with a safe discharge plan.

## 2024-05-31 NOTE — PROGRESS NOTES
Occupational Therapy    Evaluation and Treatment    Patient Name: Too Thornton  MRN: [id number]  Today's Date: 5/31/2024  Room: [address] [place], [place] [address]  Time Calculation  Start Time: 0859  Stop Time: 0932  Time Calculation (min): 33 min    Assessment  IP OT Assessment  OT Assessment: Pt's ability to complete ADLs currently limited by spinal precautions and deficits in activity tolerance, functional strength, and dynamic balance. He will benefit from skilled OT while admitted to increase IND in ADLs/IADLs prior to d/c.  Prognosis: Good  Barriers to Discharge: None  Evaluation/Treatment Tolerance: Patient tolerated treatment well  Medical Staff Made Aware: Yes  End of Session Communication: Bedside nurse  End of Session Patient Position: Up in chair, Alarm on  Plan:  Treatment Interventions: ADL retraining, Functional transfer training, UE strengthening/ROM, Endurance training, Patient/family training, Equipment evaluation/education, Compensatory technique education  OT Frequency: 2 times per week  OT Discharge Recommendations: Low intensity level of continued care  OT Recommended Transfer Status: Minimal assist, Assist of 1  OT - OK to Discharge: Yes    Subjective   Current Problem:  1. Acquired spondylolisthesis of lumbosacral region        2. Spinal stenosis, lumbar region with neurogenic claudication  FL fluoro images no charge    FL fluoro images no charge      3. Encounter for examination for normal comparison and control in clinical research program  FL fluoro images no charge    FL fluoro images no charge        General:  Reason for Referral: L4-5 TLIF  Past Medical History Relevant to Rehab: depression, CAD, hyperlipidemia, hypertension, sleep apnea, MTHFR deficiency and homocystinuria, prostate cancer, spinal stenosis, arthritis, chronic pain disorder  Co-Treatment: PT  Co-Treatment Reason: PT present for mobility part of evaluation to increase pt's safety and benefit from provided therapy services  Prior to Session  Communication: Bedside nurse  Patient Position Received: Bed, 3 rail up, Alarm off, not on at start of session  Family/Caregiver Present: No  General Comment: Pt pleasant and agreeable to OT evaluation, reports feeling like he is moving better than he did prior to surgery.   Precautions:  Medical Precautions: Fall precautions, Spinal precautions  Post-Surgical Precautions: Spinal precautions  Vital Signs:     Pain:  Pain Assessment  Pain Assessment: 0-10  Pain Score: 1  Pain Type: Surgical pain  Pain Location: Back  Pain Orientation: Lower  Pain Interventions: Repositioned  Response to Interventions: No change, best at rest  Lines/Tubes/Drains:  Arterial Line 05/30/24 Right Radial (Active)   Number of days: 0       Urethral Catheter 16 Fr. (Active)   Number of days: 0       Closed/Suction Drain Inferior;Midline Back Accordion 10 Fr. (Active)   Number of days: 0         Objective   Cognition:  Overall Cognitive Status: Within Functional Limits  Orientation Level: Oriented X4           Home Living:  Type of Home: House  Lives With: Spouse  Home Adaptive Equipment: Walker rolling or standard  Home Layout: Two level, 1/2 bath on main level, Bed/bath upstairs, Stairs to alternate level with rails  Alternate Level Stairs-Rails: Left  Alternate Level Stairs-Number of Steps: Flight  Home Access: Stairs to enter without rails  Entrance Stairs-Number of Steps: 2+2  Bathroom Shower/Tub: Walk-in shower  Bathroom Toilet: Standard  Bathroom Equipment:  (Reports taking walker into shower with him)   Prior Function:  Level of [place]: Independent with ADLs and functional transfers, Independent with homemaking with ambulation  ADL Assistance: Independent  Homemaking Assistance: Independent  Ambulatory Assistance: Independent  Vocational: Retired  Leisure: Geneology  IADL History:  Homemaking Responsibilities: Yes  Meal Prep Responsibility: Primary  Laundry Responsibility: Secondary  Cleaning Responsibility: Secondary  Bill  Paying/Finance Responsibility: Primary  Shopping Responsibility: Primary  Homemaking Comments: Shares with spouse  Current License: Yes  Mode of Transportation: Car  Occupation: Retired  Type of Occupation:  for [employer]  Leisure and Hobbies: Geneology  ADL:  Eating Assistance: Independent (Anticipated)  Grooming Assistance: Stand by (Anticipated)  Grooming Deficit: Supervision/safety  Bathing Assistance: Minimal (Anticipated)  UE Dressing Assistance: Minimal (Anticipated)  LE Dressing Assistance: Minimal  Toileting Assistance with Device: Minimal (Simulated)  Activity Tolerance:  Endurance: Tolerates 10 - 20 min exercise with multiple rests  Balance:  Dynamic Standing Balance  Dynamic Standing-Balance Support: Bilateral upper extremity supported  Dynamic Standing-Comments: CGA with FWW  Static Sitting Balance  Static Sitting-Balance Support: No upper extremity supported, Feet supported  Static Sitting-Level of Assistance: Independent  Static Standing Balance  Static Standing-Balance Support: Bilateral upper extremity supported  Static Standing-Level of Assistance: Close supervision  Static Standing-Comment/Number of Minutes: FWW  Bed Mobility/Transfers: Bed Mobility/Transfers: Bed Mobility  Bed Mobility: Yes  Bed Mobility 1  Bed Mobility 1: Supine to sitting  Level of Assistance 1: Minimum assistance  Bed Mobility Comments 1: Education on log roll technique, Min A for sitting up from L side lying.   and Transfers  Transfer: Yes  Transfer 1  Transfer From 1: Sit to, Stand to  Transfer to 1: Stand, Sit  Technique 1: Sit to stand, Stand to sit  Transfer Device 1: Walker  Transfer Level of Assistance 1: Minimum assistance  Trials/Comments 1: Cues for improved positioning and technique  Transfers 2  Transfer From 2: Bed to  Transfer to 2: Chair with arms  Technique 2:  (Ambulating)  Transfer Device 2: Walker  Transfer Level of Assistance 2: Contact guard  Trials/Comments 2: CGA for safety, cues for  improved positioning  IADL's:   Homemaking Responsibilities: Yes  Meal Prep Responsibility: Primary  Laundry Responsibility: Secondary  Cleaning Responsibility: Secondary  Bill Paying/Finance Responsibility: Primary  Shopping Responsibility: Primary  Homemaking Comments: Shares with spouse  Current License: Yes  Mode of Transportation: Car  Occupation: Retired  Type of Occupation:  for [employer]  Leisure and Hobbies: Geneology  Vision: Vision - Basic Assessment  Current Vision: Wears glasses all the time   and    Sensation:  Light Touch: No apparent deficits  Strength:  Strength Comments: B UEs WFL  Perception:  Inattention/Neglect: Appears intact  Coordination:  Movements are Fluid and Coordinated: Yes   Hand Function:  Hand Function  Gross Grasp: Functional  Coordination: Functional  Extremities:   RUE   RUE : Within Functional Limits, LUE   LUE: Within Functional Limits,  , and        Outcome Measures: [facility] Daily Activity  Putting on and taking off regular lower body clothing: A little  Bathing (including washing, rinsing, drying): A little  Putting on and taking off regular upper body clothing: None  Toileting, which includes using toilet, bedpan or urinal: A little  Taking care of personal grooming such as brushing teeth: A little  Eating Meals: None  Daily Activity - Total Score: 20         ,     OT Adult Other Outcome Measures  4AT: 4 AT -    Education Documentation  Handouts, taught by Casper Biggs OT at 5/31/2024 10:53 AM.  Learner: Patient  Readiness: Acceptance  Method: Explanation, Demonstration, Handout  Response: Verbalizes Understanding, Demonstrated Understanding    Body Mechanics, taught by Casper Biggs OT at 5/31/2024 10:53 AM.  Learner: Patient  Readiness: Acceptance  Method: Explanation, Demonstration, Handout  Response: Verbalizes Understanding, Demonstrated Understanding    Precautions, taught by Casper Biggs OT at 5/31/2024 10:53 AM.  Learner: Patient  Readiness:  Acceptance  Method: Explanation, Demonstration, Handout  Response: Verbalizes Understanding, Demonstrated Understanding    ADL Training, taught by Casper Biggs OT at 5/31/2024 10:53 AM.  Learner: Patient  Readiness: Acceptance  Method: Explanation, Demonstration, Handout  Response: Verbalizes Understanding, Demonstrated Understanding    Education Comments  No comments found.        Goals:   Encounter Problems       Encounter Problems (Active)       ADLs       Patient with complete lower body dressing with modified independent level of assistance donning and doffing all LE clothes  with PRN adaptive equipment while edge of bed  and standing (Progressing)       Start:  05/31/24    Expected End:  06/21/24            Patient will complete daily grooming tasks brushing teeth and washing face/hair with modified independent level of assistance and PRN adaptive equipment while standing. (Progressing)       Start:  05/31/24    Expected End:  06/21/24            Patient will complete toileting including hygiene clothing management/hygiene with modified independent level of assistance and raised toilet seat and grab bars. (Progressing)       Start:  05/31/24    Expected End:  06/21/24               BALANCE       Pt will maintain dynamic standing balance during ADL task with modified independent level of assistance in order to demonstrate decreased risk of falling and improved postural control. (Progressing)       Start:  05/31/24    Expected End:  06/21/24               MOBILITY       Patient will perform Functional mobility  Household distances/Community Distances with modified independent level of assistance and least restrictive device in order to improve safety and functional mobility. (Progressing)       Start:  05/31/24    Expected End:  06/21/24                   Treatment Completed on Evaluation  Activities of Daily Living:        LE Dressing  LE Dressing: Yes  Sock Level of Assistance: Minimum assistance  LE Dressing  Where Assessed: Edge of bed  LE Dressing Comments: Education on use of figure four to maintain spinal precautions, pt able to implement with Min A to successfully manage socks.     Therapy/Activity:     Therapeutic Activity  Therapeutic Activity Performed: Yes  Therapeutic Activity 1: Pt provided with handout containing spinal precautions, strategies for maintaining precautions during daily activities, and information on the log roll technique. Therapist provides demonstration and explanation to reinforce provided information. Pt recpetive to education and asks appropriate questions throughout. All questions answered at this time.  Therapeutic Activity 2: Pt performed functional mobility within room and into hallway to increase standing tolerance/balance. Cues for improved safety throughout. Education on energy conservation for improved IND.        05/31/24 at 10:54 AM   Casper Biggs OT   Rehab Office: [phone]

## 2024-05-31 NOTE — PROGRESS NOTES
Physical Therapy    Physical Therapy Evaluation & Treatment    Patient Name: Too Thornton  MRN: [id number]  Today's Date: 5/31/2024   Time Calculation  Start Time: 0907  Stop Time: 0931  Time Calculation (min): 24 min    Assessment/Plan   PT Assessment  PT Assessment Results: Decreased strength, Decreased endurance, Decreased mobility, Impaired balance  Rehab Prognosis: Good  Evaluation/Treatment Tolerance: Patient tolerated treatment well  Medical Staff Made Aware: Yes  Strengths: Ability to acquire knowledge, Attitude of self  End of Session Communication: Bedside nurse  Assessment Comment: Pt with good tolerance for upright activity today. Pt able to perform 2 trials of ambulation 30-40ft with WW and CGA. Patient would benefit from skilled physical therapy to maximize functional mobility and safety. Pt is appropriate for low intensity therapy when medically appropriate for DC.  End of Session Patient Position: Up in chair, Alarm on (CB in reach)   IP OR SWING BED PT PLAN  Inpatient or Swing Bed: Inpatient  PT Plan  Treatment/Interventions: Bed mobility, Transfer training, Gait training, Stair training, Balance training, Strengthening, Endurance training, Therapeutic exercise, Therapeutic activity, Home exercise program  PT Plan: Skilled PT  PT Frequency: Daily  PT Discharge Recommendations: Low intensity level of continued care  Equipment Recommended upon Discharge:  (TBD)  PT Recommended Transfer Status: Assist x1, Assistive device (WW)  PT - OK to Discharge: Yes (meaning pt seen and dc rec made)  RN cleared prior to session    Subjective   General Visit Information:  General  Reason for Referral: L4-5 TLIF  Past Medical History Relevant to Rehab: depression, CAD, hyperlipidemia, hypertension, sleep apnea, MTHFR deficiency and homocystinuria, prostate cancer, spinal stenosis, arthritis, chronic pain disorder  Co-Treatment: OT  Co-Treatment Reason: to maximize patient mobility and safety  Prior to Session  Communication: Bedside nurse  Patient Position Received: Bed, 3 rail up, Alarm off, not on at start of session  Preferred Learning Style: auditory, verbal  General Comment: Pt pleasant and agreeable to therapy  Home Living:  Home Living  Type of Home: House  Lives With: Spouse  Home Adaptive Equipment: Walker rolling or standard  Home Layout: Two level, 1/2 bath on main level, Bed/bath upstairs, Stairs to alternate level with rails  Alternate Level Stairs-Rails: Left  Alternate Level Stairs-Number of Steps: 12  Home Access: Stairs to enter without rails  Entrance Stairs-Rails: None  Entrance Stairs-Number of Steps: 2+2  Bathroom Shower/Tub: Walk-in shower  Bathroom Toilet: Standard  Bathroom Equipment:  (takes walker into shower with him)  Prior Level of Function:  Prior Function Per Pt/Caregiver Report  Level of [place]: Independent with ADLs and functional transfers, Independent with homemaking with ambulation  ADL Assistance: Independent  Homemaking Assistance: Independent  Ambulatory Assistance: Independent  Vocational: Retired  Leisure: Geneology  Prior Function Comments: Pt was IND in all ADLs and IADLs prior to admit. Pt used no AD for ambulation. Pt reports wife does cleaning and laundry.  Precautions:  Precautions  Medical Precautions: Fall precautions, Spinal precautions  Post-Surgical Precautions: Spinal precautions    Objective   Pain:  Pain Assessment  Pain Assessment: 0-10  Pain Score: 1  Pain Type: Surgical pain  Pain Location: Back  Pain Orientation: Lower  Pain Interventions: Repositioned, Ambulation/increased activity  Response to Interventions: no change  Cognition:  Cognition  Overall Cognitive Status: Within Functional Limits  Arousal/Alertness: Appropriate responses to stimuli  Orientation Level: Oriented X4  Following Commands: Follows all commands and directions without difficulty    General Assessments:  Activity Tolerance  Endurance: Tolerates 10 - 20 min exercise with multiple  rests    Sensation  Light Touch: No apparent deficits  Sensation Comment: denies numbness and tingling       Coordination  Movements are Fluid and Coordinated: Yes    Postural Control  Postural Control: Within Functional Limits    Static Sitting Balance  Static Sitting-Balance Support: Bilateral upper extremity supported, Feet supported  Static Sitting-Level of Assistance: Contact guard    Static Standing Balance  Static Standing-Balance Support: Bilateral upper extremity supported  Static Standing-Level of Assistance: Contact guard  Static Standing-Comment/Number of Minutes: WW  Functional Assessments:  Bed Mobility  Bed Mobility: Yes  Bed Mobility 1  Bed Mobility 1: Supine to sitting  Level of Assistance 1: Minimum assistance  Bed Mobility Comments 1: min A at trunk; min vc for log roll and hand placement    Transfers  Transfer: Yes  Transfer 1  Transfer From 1: Sit to, Stand to  Transfer to 1: Stand, Sit  Technique 1: Sit to stand, Stand to sit  Transfer Device 1: Walker  Transfer Level of Assistance 1: Minimum assistance  Trials/Comments 1: 2 trials; min vc for hand placement, posture, and safety  Transfers 2  Transfer From 2: Bed to  Transfer to 2: Chair with arms  Technique 2:  (steps)  Transfer Device 2: Walker  Transfer Level of Assistance 2: Contact guard  Trials/Comments 2: min vc for safety, fully turning, and sequencing    Ambulation/Gait Training  Apart of treatment    RLE   RLE : Exceptions to WFL  Strength RLE  RLE Overall Strength: Greater than or equal to 3/5 as evidenced by functional mobility  R Hip Flexion: 3+/5  R Knee Extension: 3+/5  R Ankle Dorsiflexion: 4/5  R Ankle Plantar Flexion: 4/5  LLE   LLE : Exceptions to WFL  Strength LLE  LLE Overall Strength: Greater than or equal to 3/5 as evidenced by functional mobility  L Hip Flexion: 3+/5  L Knee Extension: 4/5  L Ankle Dorsiflexion: 4/5  L Ankle Plantar Flexion: 4/5  Treatments:  Therapeutic Activity  Therapeutic Activity Performed:  Yes  Therapeutic Activity 1: sttaic/dynamic standing in bathroom x 2 min with CGA and WW  Therapeutic Activity 2: Pt educated on spinal precautions and log roll with bed mobility    Ambulation/Gait Training  Ambulation/Gait Training Performed: Yes  Ambulation/Gait Training 1  Surface 1: Level tile  Device 1: Rolling walker  Assistance 1: Contact guard  Quality of Gait 1: Decreased step length, Inconsistent stride length  Comments/Distance (ft) 1: 30ft, 40ft; Min vc safety, AD use, sequencing, and posture  Outcome Measures:  [facility] Basic Mobility  Turning from your back to your side while in a flat bed without using bedrails: A little  Moving from lying on your back to sitting on the side of a flat bed without using bedrails: A little  Moving to and from bed to chair (including a wheelchair): A little  Standing up from a chair using your arms (e.g. wheelchair or bedside chair): A little  To walk in hospital room: A little  Climbing 3-5 steps with railing: Total  Basic Mobility - Total Score: 16    Encounter Problems       Encounter Problems (Active)       PT Problem       Patient will complete supine to sit and sit to supine Independent        Start:  05/31/24    Expected End:  06/14/24            Patient will verbalize and demonstrate Spinal Precautions independently.        Start:  05/31/24    Expected End:  06/14/24            Patient will perform sit<>stand transfer with LRAD, and Modified Independent        Start:  05/31/24    Expected End:  06/14/24            Patient will ambulate >150' with LRAD and Modified Independent        Start:  05/31/24    Expected End:  06/14/24            Patient will ascend/descend 4 steps with No Rails and supervision        Start:  05/31/24    Expected End:  06/14/24                   Education Documentation  Precautions, taught by Lilia Arora, PT at 5/31/2024 11:22 AM.  Learner: Patient  Readiness: Acceptance  Method: Explanation, Demonstration  Response: Verbalizes  Understanding, Needs Reinforcement    Body Mechanics, taught by Lilia Arora PT at 5/31/2024 11:22 AM.  Learner: Patient  Readiness: Acceptance  Method: Explanation, Demonstration  Response: Verbalizes Understanding, Needs Reinforcement    Mobility Training, taught by Lilia Arora PT at 5/31/2024 11:22 AM.  Learner: Patient  Readiness: Acceptance  Method: Explanation, Demonstration  Response: Verbalizes Understanding, Needs Reinforcement    Education Comments  No comments found.

## 2024-05-31 NOTE — PROGRESS NOTES
"Too Thornton is a 73 y.o. male on day 1 of admission presenting with Acquired spondylolisthesis of lumbosacral region.    Subjective   No events overnight    Objective     Physical Exam  AOx3  RUE D5 B5 T5 HG/IO 5  RLE HF5 KE5 DF/PF 5  LUE D5 B5 T5 HG/IO 5  LLE HF5 KE5 DF/PF 5  Incision c/d/I, drain in place  SILT    Last Recorded Vitals  Blood pressure 124/70, pulse 63, temperature 35.9 °C (96.6 °F), temperature source Temporal, resp. rate 16, height 1.88 m (6' 2\"), weight 110 kg (242 lb 8.1 oz), SpO2 96%.  Intake/Output last 3 Shifts:  I/O last 3 completed shifts:  In: 2300 [I.V.:900; IV Piggyback:1400]  Out: 1025 [Urine:500; Drains:125; Blood:400]    Relevant Results  Lab Results   Component Value Date    WBC 7.4 05/30/2024    HGB 10.9 (L) 05/30/2024    HCT 33.7 (L) 05/30/2024    MCV 92 05/30/2024     05/30/2024     Lab Results   Component Value Date    GLUCOSE 135 (H) 05/30/2024    CALCIUM 8.7 05/30/2024     05/30/2024    K 4.8 05/30/2024    CO2 26 05/30/2024     05/30/2024    BUN 15 05/30/2024    CREATININE 1.14 05/30/2024       This patient has a urinary catheter   Reason for the urinary catheter remaining today? Urine catheter unnecessary, will be removed today    Assessment/Plan   Principal Problem:    Acquired spondylolisthesis of lumbosacral region  Active Problems:    Abnormal EKG    CAD (coronary artery disease)    Stented coronary artery    History of coronary artery bypass graft    Hyperlipidemia    HTN (hypertension)    SUSY (obstructive sleep apnea)    Anticoagulant long-term use    MTHFR (methylene THF reductase) deficiency and homocystinuria (Multi)    Chronic neck pain    Chronic pain syndrome    Chronic low back pain    Spinal stenosis    74 y/o M w/ h/o HTN, HLD, CAD s/p WINDY on DAP, MTHFR deficiency and homocystinuria, prostate cancer p/w back pain, BLE pain R>L, 5/30 s/p L4-5 TLIF  Floor  Maintain drain, monitor output  Con't ASA  Hold PLX, restart POD5  Uprights today  Dc " palmer, void trial   PTOT           Dionicio Hewitt MD

## 2024-05-31 NOTE — CARE PLAN
Problem: Pain  Goal: My pain/discomfort is manageable  Outcome: Progressing     Problem: Safety  Goal: Patient will be injury free during hospitalization  Outcome: Progressing  Goal: I will remain free of falls  Outcome: Progressing     Problem: Daily Care  Goal: Daily care needs are met  Outcome: Progressing     Problem: Psychosocial Needs  Goal: Demonstrates ability to cope with hospitalization/illness  Outcome: Progressing  Goal: Collaborate with me, my family, and caregiver to identify my specific goals  Outcome: Progressing     Problem: Discharge Barriers  Goal: My discharge needs are met  Outcome: Progressing     Problem: Fall/Injury  Goal: Not fall by end of shift  Outcome: Progressing  Goal: Be free from injury by end of the shift  Outcome: Progressing  Goal: Verbalize understanding of personal risk factors for fall in the hospital  Outcome: Progressing  Goal: Verbalize understanding of risk factor reduction measures to prevent injury from fall in the home  Outcome: Progressing  Goal: Use assistive devices by end of the shift  Outcome: Progressing  Goal: Pace activities to prevent fatigue by end of the shift  Outcome: Progressing         The patient's goals for the shift include  Rest     The clinical goals for the shift include  Pain Management    Over the shift, the patient did make progress toward the following goals.

## 2024-06-01 PROCEDURE — 97530 THERAPEUTIC ACTIVITIES: CPT | Mod: GP,CQ

## 2024-06-01 PROCEDURE — 2500000002 HC RX 250 W HCPCS SELF ADMINISTERED DRUGS (ALT 637 FOR MEDICARE OP, ALT 636 FOR OP/ED): Performed by: STUDENT IN AN ORGANIZED HEALTH CARE EDUCATION/TRAINING PROGRAM

## 2024-06-01 PROCEDURE — 2500000001 HC RX 250 WO HCPCS SELF ADMINISTERED DRUGS (ALT 637 FOR MEDICARE OP): Performed by: STUDENT IN AN ORGANIZED HEALTH CARE EDUCATION/TRAINING PROGRAM

## 2024-06-01 PROCEDURE — 97116 GAIT TRAINING THERAPY: CPT | Mod: GP,CQ

## 2024-06-01 PROCEDURE — 1100000001 HC PRIVATE ROOM DAILY

## 2024-06-01 PROCEDURE — 2500000004 HC RX 250 GENERAL PHARMACY W/ HCPCS (ALT 636 FOR OP/ED)

## 2024-06-01 PROCEDURE — 2500000004 HC RX 250 GENERAL PHARMACY W/ HCPCS (ALT 636 FOR OP/ED): Performed by: STUDENT IN AN ORGANIZED HEALTH CARE EDUCATION/TRAINING PROGRAM

## 2024-06-01 RX ORDER — ACETAMINOPHEN 325 MG/1
650 TABLET ORAL EVERY 6 HOURS
Qty: 112 TABLET | Refills: 0 | Status: SHIPPED | OUTPATIENT
Start: 2024-06-01 | End: 2024-06-15

## 2024-06-01 RX ORDER — OXYCODONE HYDROCHLORIDE 5 MG/1
5 TABLET ORAL EVERY 4 HOURS PRN
Qty: 28 TABLET | Refills: 0 | Status: SHIPPED | OUTPATIENT
Start: 2024-06-01 | End: 2024-06-08

## 2024-06-01 RX ORDER — LIDOCAINE 560 MG/1
2 PATCH PERCUTANEOUS; TOPICAL; TRANSDERMAL DAILY
Qty: 30 PATCH | Refills: 0 | Status: SHIPPED | OUTPATIENT
Start: 2024-06-02 | End: 2024-07-02

## 2024-06-01 RX ORDER — AMOXICILLIN 250 MG
2 CAPSULE ORAL 2 TIMES DAILY
Qty: 56 TABLET | Refills: 0 | Status: SHIPPED | OUTPATIENT
Start: 2024-06-01 | End: 2024-06-15

## 2024-06-01 RX ORDER — CYCLOBENZAPRINE HCL 10 MG
10 TABLET ORAL 3 TIMES DAILY
Qty: 42 TABLET | Refills: 0 | Status: SHIPPED | OUTPATIENT
Start: 2024-06-01 | End: 2024-06-15

## 2024-06-01 RX ADMIN — ASPIRIN 81 MG: 81 TABLET, COATED ORAL at 21:58

## 2024-06-01 RX ADMIN — ROSUVASTATIN 5 MG: 5 TABLET, FILM COATED ORAL at 09:16

## 2024-06-01 RX ADMIN — ACETAMINOPHEN 650 MG: 325 TABLET ORAL at 12:18

## 2024-06-01 RX ADMIN — KETOROLAC TROMETHAMINE 15 MG: 15 INJECTION, SOLUTION INTRAMUSCULAR; INTRAVENOUS at 16:12

## 2024-06-01 RX ADMIN — METOPROLOL TARTRATE 50 MG: 50 TABLET, FILM COATED ORAL at 09:16

## 2024-06-01 RX ADMIN — ACETAMINOPHEN 650 MG: 325 TABLET ORAL at 05:29

## 2024-06-01 RX ADMIN — ASPIRIN 81 MG: 81 TABLET, COATED ORAL at 09:16

## 2024-06-01 RX ADMIN — CYANOCOBALAMIN TAB 1000 MCG 1000 MCG: 1000 TAB at 09:16

## 2024-06-01 RX ADMIN — KETOROLAC TROMETHAMINE 15 MG: 15 INJECTION, SOLUTION INTRAMUSCULAR; INTRAVENOUS at 09:16

## 2024-06-01 RX ADMIN — FAMOTIDINE 20 MG: 20 TABLET, FILM COATED ORAL at 21:58

## 2024-06-01 RX ADMIN — SENNOSIDES AND DOCUSATE SODIUM 2 TABLET: 50; 8.6 TABLET ORAL at 09:17

## 2024-06-01 RX ADMIN — HEPARIN SODIUM 5000 UNITS: 5000 INJECTION INTRAVENOUS; SUBCUTANEOUS at 16:12

## 2024-06-01 RX ADMIN — ACETAMINOPHEN 650 MG: 325 TABLET ORAL at 17:20

## 2024-06-01 RX ADMIN — CYCLOBENZAPRINE 10 MG: 10 TABLET, FILM COATED ORAL at 21:58

## 2024-06-01 RX ADMIN — ACETAMINOPHEN 650 MG: 325 TABLET ORAL at 23:20

## 2024-06-01 RX ADMIN — CYCLOBENZAPRINE 10 MG: 10 TABLET, FILM COATED ORAL at 16:12

## 2024-06-01 RX ADMIN — HEPARIN SODIUM 5000 UNITS: 5000 INJECTION INTRAVENOUS; SUBCUTANEOUS at 21:58

## 2024-06-01 RX ADMIN — BUPROPION HYDROCHLORIDE 75 MG: 75 TABLET, FILM COATED ORAL at 09:16

## 2024-06-01 RX ADMIN — FAMOTIDINE 20 MG: 20 TABLET, FILM COATED ORAL at 09:16

## 2024-06-01 RX ADMIN — HEPARIN SODIUM 5000 UNITS: 5000 INJECTION INTRAVENOUS; SUBCUTANEOUS at 05:29

## 2024-06-01 RX ADMIN — FLUOXETINE 40 MG: 20 CAPSULE ORAL at 09:16

## 2024-06-01 RX ADMIN — CYCLOBENZAPRINE 10 MG: 10 TABLET, FILM COATED ORAL at 09:17

## 2024-06-01 ASSESSMENT — COGNITIVE AND FUNCTIONAL STATUS - GENERAL
CLIMB 3 TO 5 STEPS WITH RAILING: A LITTLE
MOVING TO AND FROM BED TO CHAIR: A LITTLE
MOBILITY SCORE: 18
MOVING FROM LYING ON BACK TO SITTING ON SIDE OF FLAT BED WITH BEDRAILS: A LITTLE
DRESSING REGULAR LOWER BODY CLOTHING: A LITTLE
MOVING FROM LYING ON BACK TO SITTING ON SIDE OF FLAT BED WITH BEDRAILS: A LITTLE
WALKING IN HOSPITAL ROOM: A LITTLE
STANDING UP FROM CHAIR USING ARMS: A LITTLE
TURNING FROM BACK TO SIDE WHILE IN FLAT BAD: A LITTLE
MOVING TO AND FROM BED TO CHAIR: A LITTLE
STANDING UP FROM CHAIR USING ARMS: A LITTLE
CLIMB 3 TO 5 STEPS WITH RAILING: A LITTLE
TOILETING: A LITTLE
TURNING FROM BACK TO SIDE WHILE IN FLAT BAD: A LITTLE
WALKING IN HOSPITAL ROOM: A LITTLE
MOBILITY SCORE: 18
DAILY ACTIVITIY SCORE: 20
PERSONAL GROOMING: A LITTLE
HELP NEEDED FOR BATHING: A LITTLE

## 2024-06-01 ASSESSMENT — PAIN - FUNCTIONAL ASSESSMENT
PAIN_FUNCTIONAL_ASSESSMENT: 0-10

## 2024-06-01 ASSESSMENT — PAIN SCALES - GENERAL
PAINLEVEL_OUTOF10: 0 - NO PAIN
PAINLEVEL_OUTOF10: 3
PAINLEVEL_OUTOF10: 0 - NO PAIN
PAINLEVEL_OUTOF10: 0 - NO PAIN

## 2024-06-01 NOTE — PROGRESS NOTES
"Too Thornton is a 73 y.o. male on day 2 of admission presenting with Acquired spondylolisthesis of lumbosacral region.    Subjective   No events overnight    Objective     Physical Exam  AOx3  RUE D5 B5 T5 HG/IO 5  RLE HF5 KE5 DF/PF 5  LUE D5 B5 T5 HG/IO 5  LLE HF5 KE5 DF/PF 5  Incision c/d/I, drain  SILT    Last Recorded Vitals  Blood pressure 127/72, pulse 74, temperature 36.5 °C (97.7 °F), temperature source Temporal, resp. rate 16, height 1.88 m (6' 2\"), weight 110 kg (242 lb 8.1 oz), SpO2 94%.  Intake/Output last 3 Shifts:  I/O last 3 completed shifts:  In: 425 (3.9 mL/kg) [P.O.:425]  Out: 1520 (13.8 mL/kg) [Urine:1220 (0.3 mL/kg/hr); Drains:300]  Weight: 110 kg     Relevant Results  Lab Results   Component Value Date    WBC 9.7 05/31/2024    HGB 9.9 (L) 05/31/2024    HCT 30.5 (L) 05/31/2024    MCV 94 05/31/2024     05/31/2024     Lab Results   Component Value Date    GLUCOSE 119 (H) 05/31/2024    CALCIUM 8.5 (L) 05/31/2024     05/31/2024    K 4.8 05/31/2024    CO2 25 05/31/2024     05/31/2024    BUN 20 05/31/2024    CREATININE 1.21 05/31/2024       This patient has a urinary catheter   Reason for the urinary catheter remaining today? Urine catheter unnecessary, will be removed today    Assessment/Plan   Principal Problem:    Acquired spondylolisthesis of lumbosacral region  Active Problems:    Abnormal EKG    CAD (coronary artery disease)    Stented coronary artery    History of coronary artery bypass graft    Hyperlipidemia    HTN (hypertension)    SUSY (obstructive sleep apnea)    Anticoagulant long-term use    MTHFR (methylene THF reductase) deficiency and homocystinuria (Multi)    Chronic neck pain    Chronic pain syndrome    Chronic low back pain    Spinal stenosis    72 y/o M w/ h/o HTN, HLD, CAD s/p WINDY on DAP, MTHFR deficiency and homocystinuria, prostate cancer p/w back pain, BLE pain R>L, 5/30 s/p L4-5 TLIF, uprights hardware intact    Floor  Drain, dc today  Con't ASA  Hold PLX, " restart POD5  PTOT-OP  SCD, SQH           Dionicio Hewitt MD

## 2024-06-01 NOTE — CARE PLAN
Problem: Pain  Goal: My pain/discomfort is manageable  Outcome: Progressing     Problem: Safety  Goal: Patient will be injury free during hospitalization  Outcome: Progressing  Goal: I will remain free of falls  Outcome: Progressing     Problem: Daily Care  Goal: Daily care needs are met  Outcome: Progressing     Problem: Psychosocial Needs  Goal: Demonstrates ability to cope with hospitalization/illness  Outcome: Progressing  Goal: Collaborate with me, my family, and caregiver to identify my specific goals  Outcome: Progressing     Problem: Discharge Barriers  Goal: My discharge needs are met  Outcome: Progressing     Problem: Fall/Injury  Goal: Not fall by end of shift  Outcome: Progressing  Goal: Be free from injury by end of the shift  Outcome: Progressing  Goal: Verbalize understanding of personal risk factors for fall in the hospital  Outcome: Progressing  Goal: Verbalize understanding of risk factor reduction measures to prevent injury from fall in the home  Outcome: Progressing  Goal: Use assistive devices by end of the shift  Outcome: Progressing  Goal: Pace activities to prevent fatigue by end of the shift  Outcome: Progressing   The patient's goals for the shift include      The clinical goals for the shift include patient will have adequate pain control this shift

## 2024-06-01 NOTE — PROGRESS NOTES
Physical Therapy    Physical Therapy Treatment    Patient Name: Too Thornton  MRN: [id number]  Today's Date: 6/1/2024  Time Calculation  Start Time: 1010  Stop Time: 1035  Time Calculation (min): 25 min    Assessment/Plan   PT Assessment  End of Session Communication: Bedside nurse  Assessment Comment: pt with conintued progression of ambulation distances and functional mobility levels. pt able to safely negotiate stairs with no LOB or increase in pain. pt remains appropriate for LOW intensity PT upon D/C to return to [facility].  End of Session Patient Position: Up in chair, Alarm on     PT Plan  Treatment/Interventions: Bed mobility, Transfer training, Gait training, Stair training, Balance training, Strengthening, Endurance training, Therapeutic exercise, Therapeutic activity, Home exercise program  PT Plan: Skilled PT  PT Frequency: Daily  PT Discharge Recommendations: Low intensity level of continued care  Equipment Recommended upon Discharge:  (TBD)  PT Recommended Transfer Status: Assist x1, Assistive device (WW)  PT - OK to Discharge: Yes (meaning pt seen and dc rec made)      General Visit Information:   PT  Visit  PT Received On: 06/01/24  Response to Previous Treatment: Patient with no complaints from previous session.  General  Reason for Referral: L4-5 TLIF  Past Medical History Relevant to Rehab: depression, CAD, hyperlipidemia, hypertension, sleep apnea, MTHFR deficiency and homocystinuria, prostate cancer, spinal stenosis, arthritis, chronic pain disorder  Prior to Session Communication: Bedside nurse  Patient Position Received: Bed, 3 rail up, Alarm on  Preferred Learning Style: auditory, verbal  General Comment: Pt pleasant and agreeable to therapy    Subjective   Precautions:  Precautions  Medical Precautions: Fall precautions, Spinal precautions  Post-Surgical Precautions: Spinal precautions  Vital Signs:  Vital Signs  Heart Rate: 74  BP: 127/72  BP Location: Left arm  BP Method: Automatic  Patient  Position: Lying    Objective   Pain:  Pain Assessment  Pain Assessment: 0-10  Pain Score: 0 - No pain  Cognition:  Cognition  Overall Cognitive Status: Within Functional Limits  Orientation Level: Oriented X4     Postural Control:  Static Sitting Balance  Static Sitting-Balance Support: Feet supported  Static Sitting-Level of Assistance: Contact guard  Dynamic Sitting Balance  Dynamic Sitting-Balance Support: Feet supported  Dynamic Sitting-Balance: Reaching for objects (Donning gown)  Dynamic Sitting-Comments: CGA  Static Standing Balance  Static Standing-Balance Support: Bilateral upper extremity supported, No upper extremity supported  Static Standing-Level of Assistance: Contact guard  Static Standing-Comment/Number of Minutes:  (FWW and then with no AD)  Dynamic Standing Balance  Dynamic Standing-Balance Support: Bilateral upper extremity supported, No upper extremity supported (FWW then no AD)  Dynamic Standing-Balance: Turning, Reaching for objects (Hand washing)    Activity Tolerance:  Activity Tolerance  Endurance: Tolerates 10 - 20 min exercise with multiple rests  Treatments:     Therapeutic Activity  Therapeutic Activity Performed: Yes  Therapeutic Activity 1: static/dynamic standing in bathroom x 2 min with CGA and WW  Therapeutic Activity 2: Pt educated on spinal precautions and log roll with bed mobility    Bed Mobility  Bed Mobility: Yes  Bed Mobility 1  Bed Mobility 1: Supine to sitting, Sitting to supine  Level of Assistance 1: Contact guard, Moderate verbal cues  Bed Mobility Comments 1: min vc for log roll and hand placement. handout given (x 2 trials with HOB flat to simulate home going needs.)    Ambulation/Gait Training  Ambulation/Gait Training Performed: Yes  Ambulation/Gait Training 1  Surface 1: Level tile  Device 1: Rolling walker  Assistance 1: Contact guard  Quality of Gait 1: Decreased step length, Inconsistent stride length  Comments/Distance (ft) 1: 60 ft  Ambulation/Gait Training  2  Surface 2: Level tile  Device 2: No device  Assistance 2: Contact guard  Quality of Gait 2: Decreased step length, Inconsistent stride length  Comments/Distance (ft) 2: 160 ft  Transfers  Transfer: Yes  Transfer 1  Transfer From 1: Sit to, Stand to  Transfer to 1: Stand, Sit  Technique 1: Sit to stand, Stand to sit  Transfer Device 1: Walker  Transfer Level of Assistance 1: Contact guard  Trials/Comments 1: x2 trials (cueing for hand placement)    Stairs  Stairs: Yes  Stairs  Rails 1: Bilateral  Device 1: Railing  Assistance 1: Contact guard  Comment/Number of Steps 1: 4  Stairs 2  Rails 2: None (Comment)  Device 2: No device  Assistance 2: Contact guard  Comment/Number of Steps 2: 4 (no LOB)    Outcome Measures:  [facility] Basic Mobility  Turning from your back to your side while in a flat bed without using bedrails: A little  Moving from lying on your back to sitting on the side of a flat bed without using bedrails: A little  Moving to and from bed to chair (including a wheelchair): A little  Standing up from a chair using your arms (e.g. wheelchair or bedside chair): A little  To walk in hospital room: A little  Climbing 3-5 steps with railing: A little  Basic Mobility - Total Score: 18    Education Documentation  Precautions, taught by Casey Cerrato PTA at 6/1/2024 12:31 PM.  Learner: Patient  Readiness: Acceptance  Method: Explanation  Response: Verbalizes Understanding    Body Mechanics, taught by Casey Cerrato PTA at 6/1/2024 12:31 PM.  Learner: Patient  Readiness: Acceptance  Method: Explanation  Response: Verbalizes Understanding    Mobility Training, taught by Casey Cerrato PTA at 6/1/2024 12:31 PM.  Learner: Patient  Readiness: Acceptance  Method: Explanation  Response: Verbalizes Understanding    Education Comments  No comments found.           Encounter Problems       Encounter Problems (Active)       PT Problem       Patient will complete supine to sit and sit to supine Independent        Start:   05/31/24    Expected End:  06/14/24            Patient will verbalize and demonstrate Spinal Precautions independently.        Start:  05/31/24    Expected End:  06/14/24            Patient will perform sit<>stand transfer with LRAD, and Modified Independent        Start:  05/31/24    Expected End:  06/14/24            Patient will ambulate >150' with LRAD and Modified Independent        Start:  05/31/24    Expected End:  06/14/24            Patient will ascend/descend 4 steps with No Rails and supervision        Start:  05/31/24    Expected End:  06/14/24

## 2024-06-01 NOTE — CARE PLAN
The patient's goals for the shift include  remain pain free    The clinical goals for the shift include Comfort and safety maintained      Problem: Pain  Goal: My pain/discomfort is manageable  Outcome: Progressing     Problem: Safety  Goal: Patient will be injury free during hospitalization  Outcome: Progressing     Problem: Safety  Goal: I will remain free of falls  Outcome: Progressing     Problem: Daily Care  Goal: Daily care needs are met  Outcome: Progressing     Problem: Psychosocial Needs  Goal: Collaborate with me, my family, and caregiver to identify my specific goals  Outcome: Progressing     Problem: Fall/Injury  Goal: Not fall by end of shift  Outcome: Progressing     Problem: Fall/Injury  Goal: Be free from injury by end of the shift  Outcome: Progressing

## 2024-06-01 NOTE — DISCHARGE SUMMARY
Discharge Diagnosis  Acquired spondylolisthesis of lumbosacral region    Issues Requiring Follow-Up  Routine post operative follow up    Test Results Pending At Discharge  Pending Labs       No current pending labs.            Hospital Course  Too Thornton is a 73 yr old M with h/o HTN, HLD, CAD s/p WINDY on DAPT, MTHFR deficiency and homocystinuria, prostate cancer, who presented with back pain and bilateral lower extremity radiculopathy (R>L).  On 5/30/2024 patient underwent a L4-5 TLIF. Patient's surgical drain was removed on post operative day 2 and he recovered well from surgery without complication.       On day of discharge patient was in satisfactory condition with follow up appointments arranged.      Pertinent Physical Exam At Time of Discharge  Physical Exam    Home Medications     Medication List      START taking these medications     acetaminophen 325 mg tablet; Commonly known as: Tylenol; Take 2 tablets   (650 mg) by mouth every 6 hours for 14 days.   cyclobenzaprine 10 mg tablet; Commonly known as: Flexeril; Take 1 tablet   (10 mg) by mouth 3 times a day for 14 days.   lidocaine 4 % patch; Place 2 patches over 12 hours on the skin once   daily. Remove & discard patch within 12 hours or as directed by MD.; Start   taking on: June 2, 2024   oxyCODONE 5 mg immediate release tablet; Commonly known as: Roxicodone;   Take 1 tablet (5 mg) by mouth every 4 hours if needed for severe pain (7 -   10) for up to 7 days.   sennosides-docusate sodium 8.6-50 mg tablet; Commonly known as:   Meli-Colace; Take 2 tablets by mouth 2 times a day for 14 days.     CONTINUE taking these medications     aspirin 81 mg EC tablet   buPROPion 75 mg tablet; Commonly known as: Wellbutrin   clopidogrel 75 mg tablet; Commonly known as: Plavix   cyanocobalamin 1,000 mcg tablet; Commonly known as: Vitamin B-12   ergocalciferol 1.25 MG (89515 UT) capsule; Commonly known as: Vitamin   D-2   famotidine 20 mg tablet; Commonly known as:  Pepcid   FLUoxetine 40 mg capsule; Commonly known as: PROzac   furosemide 20 mg tablet; Commonly known as: Lasix   metoprolol tartrate 50 mg tablet; Commonly known as: Lopressor   Repatha SureClick 140 mg/mL injection; Generic drug: evolocumab   rosuvastatin 5 mg tablet; Commonly known as: Crestor       Outpatient Follow-Up  Future Appointments   Date Time Provider Department Center   6/12/2024 10:45 AM NEUROSURGERY [facility] NURSE [id number] [facility]   7/2/2024  3:30 PM Lele Krishna MD [id number]       Mehreen Stevenson MD

## 2024-06-02 VITALS
RESPIRATION RATE: 16 BRPM | DIASTOLIC BLOOD PRESSURE: 73 MMHG | BODY MASS INDEX: 31.12 KG/M2 | TEMPERATURE: 98.1 F | HEIGHT: 74 IN | OXYGEN SATURATION: 95 % | SYSTOLIC BLOOD PRESSURE: 127 MMHG | WEIGHT: 242.51 LBS | HEART RATE: 76 BPM

## 2024-06-02 PROCEDURE — 97530 THERAPEUTIC ACTIVITIES: CPT | Mod: GP,CQ

## 2024-06-02 PROCEDURE — 2500000001 HC RX 250 WO HCPCS SELF ADMINISTERED DRUGS (ALT 637 FOR MEDICARE OP): Performed by: STUDENT IN AN ORGANIZED HEALTH CARE EDUCATION/TRAINING PROGRAM

## 2024-06-02 PROCEDURE — 2500000005 HC RX 250 GENERAL PHARMACY W/O HCPCS: Performed by: STUDENT IN AN ORGANIZED HEALTH CARE EDUCATION/TRAINING PROGRAM

## 2024-06-02 PROCEDURE — 2500000002 HC RX 250 W HCPCS SELF ADMINISTERED DRUGS (ALT 637 FOR MEDICARE OP, ALT 636 FOR OP/ED): Performed by: STUDENT IN AN ORGANIZED HEALTH CARE EDUCATION/TRAINING PROGRAM

## 2024-06-02 PROCEDURE — 2500000004 HC RX 250 GENERAL PHARMACY W/ HCPCS (ALT 636 FOR OP/ED)

## 2024-06-02 RX ADMIN — ERGOCALCIFEROL 50000 UNITS: 1.25 CAPSULE ORAL at 08:07

## 2024-06-02 RX ADMIN — FLUOXETINE 40 MG: 20 CAPSULE ORAL at 08:10

## 2024-06-02 RX ADMIN — FAMOTIDINE 20 MG: 20 TABLET, FILM COATED ORAL at 08:07

## 2024-06-02 RX ADMIN — HEPARIN SODIUM 5000 UNITS: 5000 INJECTION INTRAVENOUS; SUBCUTANEOUS at 06:37

## 2024-06-02 RX ADMIN — CYCLOBENZAPRINE 10 MG: 10 TABLET, FILM COATED ORAL at 08:07

## 2024-06-02 RX ADMIN — CYANOCOBALAMIN TAB 1000 MCG 1000 MCG: 1000 TAB at 08:10

## 2024-06-02 RX ADMIN — METOPROLOL TARTRATE 50 MG: 50 TABLET, FILM COATED ORAL at 08:07

## 2024-06-02 RX ADMIN — ASPIRIN 81 MG: 81 TABLET, COATED ORAL at 08:07

## 2024-06-02 RX ADMIN — ROSUVASTATIN 5 MG: 5 TABLET, FILM COATED ORAL at 08:07

## 2024-06-02 RX ADMIN — BUPROPION HYDROCHLORIDE 75 MG: 75 TABLET, FILM COATED ORAL at 08:07

## 2024-06-02 RX ADMIN — LIDOCAINE 2 PATCH: 4 PATCH TOPICAL at 08:07

## 2024-06-02 RX ADMIN — ACETAMINOPHEN 650 MG: 325 TABLET ORAL at 06:37

## 2024-06-02 ASSESSMENT — PAIN SCALES - GENERAL
PAINLEVEL_OUTOF10: 3
PAINLEVEL_OUTOF10: 5 - MODERATE PAIN

## 2024-06-02 ASSESSMENT — COGNITIVE AND FUNCTIONAL STATUS - GENERAL
WALKING IN HOSPITAL ROOM: A LITTLE
MOBILITY SCORE: 18
MOVING FROM LYING ON BACK TO SITTING ON SIDE OF FLAT BED WITH BEDRAILS: A LITTLE
PERSONAL GROOMING: A LITTLE
DRESSING REGULAR LOWER BODY CLOTHING: A LITTLE
HELP NEEDED FOR BATHING: A LITTLE
TURNING FROM BACK TO SIDE WHILE IN FLAT BAD: A LITTLE
TOILETING: A LITTLE
DAILY ACTIVITIY SCORE: 20
STANDING UP FROM CHAIR USING ARMS: A LITTLE
TURNING FROM BACK TO SIDE WHILE IN FLAT BAD: A LITTLE
MOVING TO AND FROM BED TO CHAIR: A LITTLE
MOBILITY SCORE: 18
CLIMB 3 TO 5 STEPS WITH RAILING: A LITTLE
MOVING FROM LYING ON BACK TO SITTING ON SIDE OF FLAT BED WITH BEDRAILS: A LITTLE
CLIMB 3 TO 5 STEPS WITH RAILING: A LITTLE
WALKING IN HOSPITAL ROOM: A LITTLE
MOVING TO AND FROM BED TO CHAIR: A LITTLE
STANDING UP FROM CHAIR USING ARMS: A LITTLE

## 2024-06-02 ASSESSMENT — PAIN - FUNCTIONAL ASSESSMENT
PAIN_FUNCTIONAL_ASSESSMENT: 0-10

## 2024-06-02 NOTE — PROGRESS NOTES
"Too Thornton is a 73 y.o. male on day 3 of admission presenting with Acquired spondylolisthesis of lumbosacral region.    Subjective   No events overnight    Objective     Physical Exam  AOx3  RUE D5 B5 T5 HG/IO 5  RLE HF5 KE5 DF/PF 5  LUE D5 B5 T5 HG/IO 5  LLE HF5 KE5 DF/PF 5  Incision c/d/I, drain  SILT    Last Recorded Vitals  Blood pressure 144/83, pulse 75, temperature 36 °C (96.8 °F), resp. rate 16, height 1.88 m (6' 2\"), weight 110 kg (242 lb 8.1 oz), SpO2 94%.  Intake/Output last 3 Shifts:  I/O last 3 completed shifts:  In: - (0 mL/kg)   Out: 1600 (14.5 mL/kg) [Urine:1505 (0.4 mL/kg/hr); Drains:95]  Weight: 110 kg     Relevant Results  Lab Results   Component Value Date    WBC 9.7 05/31/2024    HGB 9.9 (L) 05/31/2024    HCT 30.5 (L) 05/31/2024    MCV 94 05/31/2024     05/31/2024     Lab Results   Component Value Date    GLUCOSE 119 (H) 05/31/2024    CALCIUM 8.5 (L) 05/31/2024     05/31/2024    K 4.8 05/31/2024    CO2 25 05/31/2024     05/31/2024    BUN 20 05/31/2024    CREATININE 1.21 05/31/2024       This patient has a urinary catheter   Reason for the urinary catheter remaining today? Urine catheter unnecessary, will be removed today    Assessment/Plan   Principal Problem:    Acquired spondylolisthesis of lumbosacral region  Active Problems:    Abnormal EKG    CAD (coronary artery disease)    Stented coronary artery    History of coronary artery bypass graft    Hyperlipidemia    HTN (hypertension)    SUSY (obstructive sleep apnea)    Anticoagulant long-term use    MTHFR (methylene THF reductase) deficiency and homocystinuria (Multi)    Chronic neck pain    Chronic pain syndrome    Chronic low back pain    Spinal stenosis    74 y/o M w/ h/o HTN, HLD, CAD s/p WINDY on DAP, MTHFR deficiency and homocystinuria, prostate cancer p/w back pain, BLE pain R>L, 5/30 s/p L4-5 TLIF, uprights hardware intact    Floor  Drain, dc today  Con't ASA  Hold PLX, restart POD5  PTOT-OP  SCD, SQH    Medically " ready for dispo           Dionicio Hewitt MD

## 2024-06-02 NOTE — CARE PLAN
The patient's goals for the shift include  prevent from falls.    The clinical goals for the shift include patient will have adequate pain control this shift    Over the shift, the patient did make progress toward the following goals. Barriers to progression include n/a. Recommendations to address these barriers include n/a.

## 2024-06-02 NOTE — CARE PLAN
Problem: Pain  Goal: My pain/discomfort is manageable  6/2/2024 0910 by Elizabeth Lanza RN  Outcome: Met  6/2/2024 0909 by Elizabeth Lanza RN  Outcome: Progressing     Problem: Safety  Goal: Patient will be injury free during hospitalization  6/2/2024 0910 by Elizabeth Lanza RN  Outcome: Met  6/2/2024 0909 by Elizabeth Lanza RN  Outcome: Progressing  Goal: I will remain free of falls  6/2/2024 0910 by Elizabeth Lanza RN  Outcome: Met  6/2/2024 0909 by Elizabeth Lanza RN  Outcome: Progressing     Problem: Daily Care  Goal: Daily care needs are met  6/2/2024 0910 by Elizabeth Lanza RN  Outcome: Met  6/2/2024 0909 by Elizabeth Lanza RN  Outcome: Progressing     Problem: Psychosocial Needs  Goal: Demonstrates ability to cope with hospitalization/illness  6/2/2024 0910 by Elizabeth Lanza RN  Outcome: Met  6/2/2024 0909 by Elizabeth Lanza RN  Outcome: Progressing  Goal: Collaborate with me, my family, and caregiver to identify my specific goals  6/2/2024 0910 by Elizabeth Lanza RN  Outcome: Met  6/2/2024 0909 by Elizabeth Lanza RN  Outcome: Progressing     Problem: Discharge Barriers  Goal: My discharge needs are met  6/2/2024 0910 by Elizabeth Lanza RN  Outcome: Met  6/2/2024 0909 by Elizabeth Lanza RN  Outcome: Progressing     Problem: Fall/Injury  Goal: Not fall by end of shift  6/2/2024 0910 by Elizabeth Lanza RN  Outcome: Met  6/2/2024 0909 by Elizabeth Lanza RN  Outcome: Progressing  Goal: Be free from injury by end of the shift  6/2/2024 0910 by Elizabeth Lanza RN  Outcome: Met  6/2/2024 0909 by Elizabeth Lanza RN  Outcome: Progressing  Goal: Verbalize understanding of personal risk factors for fall in the hospital  6/2/2024 0910 by Elizabeth Lanza RN  Outcome: Met  6/2/2024 0909 by Elizabeth Lanza RN  Outcome: Progressing  Goal: Verbalize understanding of risk factor reduction measures to prevent injury from fall in the home  6/2/2024 0910 by Elizabeth Lanza RN  Outcome: Met  6/2/2024 0909 by Elizabeth Lanza, RN  Outcome: Progressing  Goal: Use assistive  devices by end of the shift  6/2/2024 0910 by Elizabeth Lanza RN  Outcome: Met  6/2/2024 0909 by Elizabeth Lanza RN  Outcome: Progressing  Goal: Pace activities to prevent fatigue by end of the shift  6/2/2024 0910 by Elizabeth Lanza RN  Outcome: Met  6/2/2024 0909 by Elizabeth Lanza RN  Outcome: Progressing   The patient's goals for the shift include rest    The clinical goals for the shift include patient will have decreased pain this shift

## 2024-06-02 NOTE — CARE PLAN
Problem: Pain  Goal: My pain/discomfort is manageable  Outcome: Progressing     Problem: Safety  Goal: Patient will be injury free during hospitalization  Outcome: Progressing  Goal: I will remain free of falls  Outcome: Progressing     Problem: Daily Care  Goal: Daily care needs are met  Outcome: Progressing     Problem: Psychosocial Needs  Goal: Demonstrates ability to cope with hospitalization/illness  Outcome: Progressing  Goal: Collaborate with me, my family, and caregiver to identify my specific goals  Outcome: Progressing     Problem: Discharge Barriers  Goal: My discharge needs are met  Outcome: Progressing     Problem: Fall/Injury  Goal: Not fall by end of shift  Outcome: Progressing  Goal: Be free from injury by end of the shift  Outcome: Progressing  Goal: Verbalize understanding of personal risk factors for fall in the hospital  Outcome: Progressing  Goal: Verbalize understanding of risk factor reduction measures to prevent injury from fall in the home  Outcome: Progressing  Goal: Use assistive devices by end of the shift  Outcome: Progressing  Goal: Pace activities to prevent fatigue by end of the shift  Outcome: Progressing   The patient's goals for the shift include rest    The clinical goals for the shift include patient will have decreased pain this shift

## 2024-06-02 NOTE — PROGRESS NOTES
Discharge Planning Note:    Too Thornton is a 73 y.o. male on day 3 of admission presenting with Acquired spondylolisthesis of lumbosacral region.      Discharge to home today per team outpatient therapy for home.         Kelli Hernandez RN

## 2024-06-02 NOTE — DISCHARGE SUMMARY
Discharge Diagnosis  Acquired spondylolisthesis of lumbosacral region    Issues Requiring Follow-Up  none    Test Results Pending At Discharge  Pending Labs       No current pending labs.            Hospital Course  Too Thornton is a 73 yr old M with h/o HTN, HLD, CAD s/p WINDY on DAPT, MTHFR deficiency and homocystinuria, prostate cancer, who presented with back pain and bilateral lower extremity radiculopathy (R>L).  On 5/30/2024 patient underwent a L4-5 TLIF. Patient's surgical drain was removed on post operative day 2 and he recovered well from surgery without complication.       On day of discharge patient was in satisfactory condition with follow up appointments arranged.      Pertinent Physical Exam At Time of Discharge  Physical Exam  Awake  Ox3  BUE 5/5  BLE 5/5  Home Medications     Medication List      START taking these medications     acetaminophen 325 mg tablet; Commonly known as: Tylenol; Take 2 tablets   (650 mg) by mouth every 6 hours for 14 days.   cyclobenzaprine 10 mg tablet; Commonly known as: Flexeril; Take 1 tablet   (10 mg) by mouth 3 times a day for 14 days.   lidocaine 4 % patch; Place 2 patches over 12 hours on the skin once   daily. Remove & discard patch within 12 hours or as directed by MD.   oxyCODONE 5 mg immediate release tablet; Commonly known as: Roxicodone;   Take 1 tablet (5 mg) by mouth every 4 hours if needed for severe pain (7 -   10) for up to 7 days.   sennosides-docusate sodium 8.6-50 mg tablet; Commonly known as:   Meli-Colace; Take 2 tablets by mouth 2 times a day for 14 days.     CONTINUE taking these medications     aspirin 81 mg EC tablet   buPROPion 75 mg tablet; Commonly known as: Wellbutrin   clopidogrel 75 mg tablet; Commonly known as: Plavix   cyanocobalamin 1,000 mcg tablet; Commonly known as: Vitamin B-12   ergocalciferol 1.25 MG (64060 UT) capsule; Commonly known as: Vitamin   D-2   famotidine 20 mg tablet; Commonly known as: Pepcid   FLUoxetine 40 mg capsule;  Commonly known as: PROzac   furosemide 20 mg tablet; Commonly known as: Lasix   metoprolol tartrate 50 mg tablet; Commonly known as: Lopressor   Repatha SureClick 140 mg/mL injection; Generic drug: evolocumab   rosuvastatin 5 mg tablet; Commonly known as: Crestor       Outpatient Follow-Up  Future Appointments   Date Time Provider Department Center   6/12/2024 10:45 AM NEUROSURGERY [facility] NURSE [id number] [facility]   7/2/2024  3:30 PM Lele Krishna MD [id number]       Grayson Luu MD

## 2024-06-02 NOTE — NURSING NOTE
Patient discharge instructions given to patient. IVS taken out. Patient belongings packed up and given to patient. Patient safely transferred out via wheelchair.

## 2024-06-04 ENCOUNTER — APPOINTMENT (OUTPATIENT)
Dept: NEUROSURGERY | Facility: CLINIC | Age: 74
End: 2024-06-04
Payer: MEDICARE

## 2024-06-12 ENCOUNTER — CLINICAL SUPPORT (OUTPATIENT)
Dept: NEUROSURGERY | Facility: HOSPITAL | Age: 74
End: 2024-06-12
Payer: MEDICARE

## 2024-06-12 NOTE — PROGRESS NOTES
I had the pleasure of seeing Too Thornton and his wife for wound/incision check. I visualized the incision. The incision is free of redness, swelling, and discharge. We reviewed incision care and follow up appointment. Too Thornton agrees and has no further questions. They will see Dr. Krishna in a few weeks for follow up.

## 2024-07-02 ENCOUNTER — APPOINTMENT (OUTPATIENT)
Dept: NEUROSURGERY | Facility: CLINIC | Age: 74
End: 2024-07-02
Payer: MEDICARE

## 2024-07-02 VITALS
RESPIRATION RATE: 20 BRPM | SYSTOLIC BLOOD PRESSURE: 120 MMHG | DIASTOLIC BLOOD PRESSURE: 78 MMHG | HEIGHT: 74 IN | HEART RATE: 82 BPM | WEIGHT: 235 LBS | BODY MASS INDEX: 30.16 KG/M2

## 2024-07-02 DIAGNOSIS — M48.062 SPINAL STENOSIS OF LUMBAR REGION WITH NEUROGENIC CLAUDICATION: Primary | ICD-10-CM

## 2024-07-02 PROCEDURE — 3074F SYST BP LT 130 MM HG: CPT | Performed by: NEUROLOGICAL SURGERY

## 2024-07-02 PROCEDURE — 1159F MED LIST DOCD IN RCRD: CPT | Performed by: NEUROLOGICAL SURGERY

## 2024-07-02 PROCEDURE — 1111F DSCHRG MED/CURRENT MED MERGE: CPT | Performed by: NEUROLOGICAL SURGERY

## 2024-07-02 PROCEDURE — 1036F TOBACCO NON-USER: CPT | Performed by: NEUROLOGICAL SURGERY

## 2024-07-02 PROCEDURE — 3078F DIAST BP <80 MM HG: CPT | Performed by: NEUROLOGICAL SURGERY

## 2024-07-02 PROCEDURE — 99024 POSTOP FOLLOW-UP VISIT: CPT | Performed by: NEUROLOGICAL SURGERY

## 2024-07-02 PROCEDURE — 1126F AMNT PAIN NOTED NONE PRSNT: CPT | Performed by: NEUROLOGICAL SURGERY

## 2024-07-02 ASSESSMENT — PAIN SCALES - GENERAL: PAINLEVEL: 0-NO PAIN

## 2024-07-02 NOTE — PROGRESS NOTES
5/30/24 L4-5 Fusion. Today better than before surgery.     73-year-old man who underwent lumbar decompression and fusion returns for follow-up.  He reports his pain is completely resolved.    On exam, incision is clean and intact.  There is no erythema or exudate.  Strength is full.    The patient has improved significantly following his surgical intervention.  I counseled him on the importance of avoiding heavy lifting or strenuous activity till he is 3-month shweta after surgery.  I be happy to see him again if any new issues arise.

## 2025-03-21 ENCOUNTER — HOSPITAL ENCOUNTER (OUTPATIENT)
Dept: RADIOLOGY | Facility: CLINIC | Age: 75
Discharge: HOME | End: 2025-03-21
Payer: MEDICARE

## 2025-03-21 VITALS — WEIGHT: 235.89 LBS | HEIGHT: 74 IN | BODY MASS INDEX: 30.27 KG/M2

## 2025-03-21 DIAGNOSIS — N63.0 UNSPECIFIED LUMP IN UNSPECIFIED BREAST: ICD-10-CM

## 2025-03-21 PROCEDURE — 77066 DX MAMMO INCL CAD BI: CPT

## 2025-06-20 NOTE — PROGRESS NOTES
Medication passed protocol.     Medication: rosuvastatin passed protocol.   Last office visit date: 05/16/2025  Next appointment scheduled?: No   Physical Therapy    Physical Therapy Treatment    Patient Name: Too Thornton  MRN: [id number]  Today's Date: 6/2/2024  Time Calculation  Start Time: 0925  Stop Time: 0940  Time Calculation (min): 15 min    Assessment/Plan   PT Assessment  PT Assessment Results: Decreased strength, Decreased range of motion, Decreased endurance, Impaired balance  Rehab Prognosis: Excellent  Evaluation/Treatment Tolerance: Patient tolerated treatment well  Medical Staff Made Aware: Yes  End of Session Communication: Bedside nurse  End of Session Patient Position: Up in chair, Alarm off, not on at start of session     PT Plan  Treatment/Interventions: Bed mobility, Transfer training, Gait training, Stair training, Balance training, Strengthening, Endurance training, Therapeutic exercise, Therapeutic activity, Home exercise program  PT Plan: Skilled PT  PT Frequency: Daily  PT Discharge Recommendations: Low intensity level of continued care  Equipment Recommended upon Discharge:  (TBD)  PT Recommended Transfer Status: Assist x1, Assistive device (WW)  PT - OK to Discharge: Yes (meaning pt seen and dc rec made)      General Visit Information:   PT  Visit  PT Received On: 06/02/24  Response to Previous Treatment: Patient with no complaints from previous session.  General  Family/Caregiver Present: Yes  Caregiver Feedback: Wife present and supportive, asking appropriate mobiilty questions throughout and verbalizes good understanding  Prior to Session Communication: Bedside nurse  Patient Position Received: Bed, 3 rail up, Alarm off, not on at start of session  General Comment: Pt pleasant and agreeable, initially requesting to practice bed mobility  Per handoff with RN, pt is appropriate for therapy, vitals are stable and pain is controlled. Other concerns prior to tx are: none    Subjective   Precautions:  Precautions  Medical Precautions: Fall precautions, Spinal precautions  Post-Surgical Precautions: Spinal precautions  Precautions  Comment: Reviewed spinal precautions with pt and wife    Objective   Pain:  Pain Assessment  Pain Assessment: 0-10  Pain Score: 5 - Moderate pain  Pain Location: Back  Pain Interventions: Repositioned  Cognition:  Cognition  Overall Cognitive Status: Within Functional Limits    Treatments:     Bed Mobility  Bed Mobility: Yes  Bed Mobility 1  Bed Mobility 1: Supine to sitting, Sitting to supine  Level of Assistance 1: Close supervision  Bed Mobility Comments 1: completed 3x, initially with max cues, progressing to min cues    Ambulation/Gait Training  Ambulation/Gait Training Performed: Yes  Ambulation/Gait Training 1  Surface 1: Level tile  Device 1: Rolling walker  Assistance 1: Close supervision  Comments/Distance (ft) 1: 200'x1 with 3 steps at midpoint  Transfers  Transfer: Yes  Transfer 1  Transfer From 1: Sit to, Stand to  Transfer to 1: Sit  Technique 1: Sit to stand, Stand to sit  Transfer Device 1: Walker  Transfer Level of Assistance 1: Contact guard  Transfers 2  Transfer From 2: Bed to  Transfer to 2: Chair with arms  Technique 2: Stand pivot  Transfer Device 2:  (no AD)  Transfer Level of Assistance 2: Contact guard  Transfers 3  Trials/Comments 3: Educated pt and wife on safe car transfer technique    Stairs  Stairs: Yes  Stairs  Rails 1: Left  Device 1: Railing  Assistance 1: Contact guard  Comment/Number of Steps 1: 3    Outcome Measures:     [facility] Basic Mobility  Turning from your back to your side while in a flat bed without using bedrails: A little  Moving from lying on your back to sitting on the side of a flat bed without using bedrails: A little  Moving to and from bed to chair (including a wheelchair): A little  Standing up from a chair using your arms (e.g. wheelchair or bedside chair): A little  To walk in hospital room: A little  Climbing 3-5 steps with railing: A little  Basic Mobility - Total Score: 18    Education Documentation  Precautions, taught by Nelly Park PTA at 6/2/2024   9:53 AM.  Learner: Patient  Readiness: Acceptance  Method: Explanation, Demonstration  Response: Verbalizes Understanding, Demonstrated Understanding    Body Mechanics, taught by Nelly Park PTA at 6/2/2024  9:53 AM.  Learner: Patient  Readiness: Acceptance  Method: Explanation, Demonstration  Response: Verbalizes Understanding, Demonstrated Understanding    Mobility Training, taught by Nelly Park PTA at 6/2/2024  9:53 AM.  Learner: Patient  Readiness: Acceptance  Method: Explanation, Demonstration  Response: Verbalizes Understanding, Demonstrated Understanding    Education Comments  No comments found.        OP EDUCATION:       Encounter Problems       Encounter Problems (Active)       PT Problem       Patient will complete supine to sit and sit to supine Independent  (Progressing)       Start:  05/31/24    Expected End:  06/14/24            Patient will verbalize and demonstrate Spinal Precautions independently.  (Progressing)       Start:  05/31/24    Expected End:  06/14/24            Patient will perform sit<>stand transfer with LRAD, and Modified Independent  (Progressing)       Start:  05/31/24    Expected End:  06/14/24            Patient will ambulate >150' with LRAD and Modified Independent  (Progressing)       Start:  05/31/24    Expected End:  06/14/24            Patient will ascend/descend 4 steps with No Rails and supervision  (Progressing)       Start:  05/31/24    Expected End:  06/14/24                 SHAYY Boo

## (undated) DEVICE — ELECTRODE, ELECTROSURGICAL, BLADE EXT 4 INCH, INSULATED

## (undated) DEVICE — PAD, GROUNDING, ELECTROSURGICAL, W/9 FT CABLE, POLYHESIVE II, ADULT, LF

## (undated) DEVICE — SUTURE, SILK, 2-0, 30 IN, SH, BLACK

## (undated) DEVICE — DRESSING, MEPILEX, BORDER FLEX, 3 X 3

## (undated) DEVICE — Device

## (undated) DEVICE — APPLICATOR, CHLORAPREP, W/ORANGE TINT, 26ML

## (undated) DEVICE — SEALANT, HEMOSTATIC, FLOSEAL, 10 ML

## (undated) DEVICE — TUBING, SMOKE EVAC, 3/8 X 10 FT

## (undated) DEVICE — DRESSING, MEPILEX, BORDER FLEX, 6 X 8

## (undated) DEVICE — SUTURE, STRATAFIX PDS PLUS, 1, OS-6, SYMMETRIC 45CM, VIOLET

## (undated) DEVICE — SUTURE, VICRYL, 0, 18 IN, CT-1, UNDYED

## (undated) DEVICE — TOOL, MR8 14CM MATCH 3MM

## (undated) DEVICE — COVER, LIGHT HANDLE, SURGICAL, FLEXIBLE, DISPOSABLE, STERILE

## (undated) DEVICE — KIT, PATIENT CARE, JACKSON TABLE W/PRONE-SAFE HEADREST

## (undated) DEVICE — DRESSING, MEPILEX, BORDER FLEX, 4 X 4

## (undated) DEVICE — WOUND SYSTEM, DEBRIDEMENT & CLEANING, O.R DUOPAK

## (undated) DEVICE — SUTURE, VICRYL RAPIDE 4-0, PS-2, 3/8 CIRCLE, UNDYED, BRAIDED, 27"

## (undated) DEVICE — ELECTRODE, ELECTROSURGICAL, BLADE, INSULATED, ENT/IMA, STERILE

## (undated) DEVICE — COVER, TABLE, UHC

## (undated) DEVICE — SUTURE, POLYSORB, 2-0, 18 IN, GS23, DETACHABLE, MULTIPACK, UNDYED

## (undated) DEVICE — GOWN, SURGICAL, SMARTGOWN, XLARGE, STERILE

## (undated) DEVICE — DRESSING, MEPILEX, POST OP, 8 X 4

## (undated) DEVICE — MANIFOLD, 4 PORT NEPTUNE STANDARD

## (undated) DEVICE — SEALER, BIPOLAR, AQUA MANTYS 6.0

## (undated) DEVICE — DRAIN, WOUND, ROUND, W/TROCAR, HOLE PATTERN, 10 IN, MEDIUM, 1/8 X 49 IN

## (undated) DEVICE — EVACUATOR, WOUND, CLOSED, 3 SPRING, 400 CC, Y CONNECTING TUBE

## (undated) DEVICE — MARKER, SKIN, RULER AND LABEL PACK, CUSTOM

## (undated) DEVICE — COVER, CART, 45 X 27 X 48 IN, CLEAR

## (undated) DEVICE — BUR, 3MM X 3.8MM, PRECISION, NEURO DRILL